# Patient Record
Sex: FEMALE | Race: AMERICAN INDIAN OR ALASKA NATIVE | Employment: OTHER | URBAN - METROPOLITAN AREA
[De-identification: names, ages, dates, MRNs, and addresses within clinical notes are randomized per-mention and may not be internally consistent; named-entity substitution may affect disease eponyms.]

---

## 2017-11-01 PROBLEM — I42.8 NICM (NONISCHEMIC CARDIOMYOPATHY) (HCC): Status: ACTIVE | Noted: 2017-11-01

## 2017-11-01 PROBLEM — I51.81 TAKOTSUBO CARDIOMYOPATHY: Status: ACTIVE | Noted: 2017-11-01

## 2017-11-01 PROBLEM — I10 ESSENTIAL HYPERTENSION: Status: ACTIVE | Noted: 2017-11-01

## 2019-06-27 ENCOUNTER — HOSPITAL ENCOUNTER (OUTPATIENT)
Dept: LAB | Age: 64
Discharge: HOME | End: 2019-06-27

## 2019-06-27 PROCEDURE — 88305 TISSUE EXAM BY PATHOLOGIST: CPT

## 2019-06-27 PROCEDURE — 88312 SPECIAL STAINS GROUP 1: CPT

## 2021-05-05 PROBLEM — R07.9 CHEST PAIN: Status: ACTIVE | Noted: 2021-05-05

## 2021-05-05 PROBLEM — R94.31 ABNORMAL EKG: Status: ACTIVE | Noted: 2021-05-05

## 2021-06-30 PROBLEM — R93.1 ABNORMAL NUCLEAR CARDIAC IMAGING TEST: Status: ACTIVE | Noted: 2021-06-30

## 2021-07-06 ENCOUNTER — HOSPITAL ENCOUNTER (OUTPATIENT)
Dept: LAB | Age: 66
Discharge: HOME | End: 2021-07-06
Payer: MEDICARE

## 2021-07-06 DIAGNOSIS — I51.81 TAKOTSUBO CARDIOMYOPATHY: ICD-10-CM

## 2021-07-06 DIAGNOSIS — R07.9 CHEST PAIN, UNSPECIFIED TYPE: ICD-10-CM

## 2021-07-06 DIAGNOSIS — I42.8 NICM (NONISCHEMIC CARDIOMYOPATHY) (HCC): ICD-10-CM

## 2021-07-06 DIAGNOSIS — R93.1 ABNORMAL NUCLEAR CARDIAC IMAGING TEST: ICD-10-CM

## 2021-07-06 LAB
ANION GAP SERPL CALC-SCNC: 4 MMOL/L (ref 7–16)
BASOPHILS # BLD: 0 K/UL (ref 0–0.2)
BASOPHILS NFR BLD: 0 % (ref 0–2)
BUN SERPL-MCNC: 18 MG/DL (ref 8–23)
CALCIUM SERPL-MCNC: 9.3 MG/DL (ref 8.3–10.4)
CHLORIDE SERPL-SCNC: 98 MMOL/L (ref 98–107)
CO2 SERPL-SCNC: 35 MMOL/L (ref 21–32)
CREAT SERPL-MCNC: 1.21 MG/DL (ref 0.6–1)
DIFFERENTIAL METHOD BLD: NORMAL
EOSINOPHIL # BLD: 0.2 K/UL (ref 0–0.8)
EOSINOPHIL NFR BLD: 3 % (ref 0.5–7.8)
ERYTHROCYTE [DISTWIDTH] IN BLOOD BY AUTOMATED COUNT: 12.8 % (ref 11.9–14.6)
GLUCOSE SERPL-MCNC: 273 MG/DL (ref 65–100)
HCT VFR BLD AUTO: 40.4 % (ref 35.8–46.3)
HGB BLD-MCNC: 12.9 G/DL (ref 11.7–15.4)
IMM GRANULOCYTES # BLD AUTO: 0 K/UL (ref 0–0.5)
IMM GRANULOCYTES NFR BLD AUTO: 0 % (ref 0–5)
LYMPHOCYTES # BLD: 2.9 K/UL (ref 0.5–4.6)
LYMPHOCYTES NFR BLD: 42 % (ref 13–44)
MCH RBC QN AUTO: 28.5 PG (ref 26.1–32.9)
MCHC RBC AUTO-ENTMCNC: 31.9 G/DL (ref 31.4–35)
MCV RBC AUTO: 89.4 FL (ref 79.6–97.8)
MONOCYTES # BLD: 0.6 K/UL (ref 0.1–1.3)
MONOCYTES NFR BLD: 9 % (ref 4–12)
NEUTS SEG # BLD: 3.1 K/UL (ref 1.7–8.2)
NEUTS SEG NFR BLD: 45 % (ref 43–78)
NRBC # BLD: 0 K/UL (ref 0–0.2)
PLATELET # BLD AUTO: 181 K/UL (ref 150–450)
PMV BLD AUTO: 10.5 FL (ref 9.4–12.3)
POTASSIUM SERPL-SCNC: 4.1 MMOL/L (ref 3.5–5.1)
RBC # BLD AUTO: 4.52 M/UL (ref 4.05–5.2)
SODIUM SERPL-SCNC: 137 MMOL/L (ref 136–145)
WBC # BLD AUTO: 6.9 K/UL (ref 4.3–11.1)

## 2021-07-06 PROCEDURE — 36415 COLL VENOUS BLD VENIPUNCTURE: CPT

## 2021-07-06 PROCEDURE — 80048 BASIC METABOLIC PNL TOTAL CA: CPT

## 2021-07-06 PROCEDURE — 85025 COMPLETE CBC W/AUTO DIFF WBC: CPT

## 2021-07-07 NOTE — PROGRESS NOTES
Patient pre-assessment complete for [facility] scheduled for [facility], arrival time 0700. Patient verified using . Patient instructed to bring all home medications in labeled bottles on the day of procedure. NPO status reinforced. Patient informed to take a full dose aspirin 325mg  or 81 mg x 4 on the day of procedure. Patient instructed to HOLD all diabetic medications and over the counter medications. Instructed they can take all other medications excluding vitamins & supplements. Patient verbalizes understanding of all instructions & denies any questions at this time.

## 2021-07-08 ENCOUNTER — HOSPITAL ENCOUNTER (OUTPATIENT)
Age: 66
Setting detail: OUTPATIENT SURGERY
Discharge: HOME | End: 2021-07-08
Attending: INTERNAL MEDICINE | Admitting: INTERNAL MEDICINE
Payer: MEDICARE

## 2021-07-08 VITALS
BODY MASS INDEX: 36.65 KG/M2 | HEIGHT: 65 IN | OXYGEN SATURATION: 96 % | WEIGHT: 220 LBS | DIASTOLIC BLOOD PRESSURE: 98 MMHG | HEART RATE: 73 BPM | SYSTOLIC BLOOD PRESSURE: 177 MMHG

## 2021-07-08 DIAGNOSIS — R94.31 ABNORMAL EKG: ICD-10-CM

## 2021-07-08 DIAGNOSIS — I42.9 CARDIOMYOPATHY (HCC): ICD-10-CM

## 2021-07-08 DIAGNOSIS — I42.8 NICM (NONISCHEMIC CARDIOMYOPATHY) (HCC): ICD-10-CM

## 2021-07-08 DIAGNOSIS — R93.1 ABNORMAL NUCLEAR CARDIAC IMAGING TEST: ICD-10-CM

## 2021-07-08 DIAGNOSIS — I42.8 OTHER CARDIOMYOPATHY (HCC): ICD-10-CM

## 2021-07-08 DIAGNOSIS — R07.9 CHEST PAIN, UNSPECIFIED TYPE: ICD-10-CM

## 2021-07-08 LAB
ATRIAL RATE: 67 BPM
CALCULATED P AXIS, ECG09: 43 DEGREES
CALCULATED R AXIS, ECG10: -54 DEGREES
CALCULATED T AXIS, ECG11: -65 DEGREES
DIAGNOSIS, 93000: NORMAL
P-R INTERVAL, ECG05: 158 MS
Q-T INTERVAL, ECG07: 424 MS
QRS DURATION, ECG06: 132 MS
QTC CALCULATION (BEZET), ECG08: 448 MS
VENTRICULAR RATE, ECG03: 67 BPM

## 2021-07-08 PROCEDURE — 76210000024 HC REC RM PH II 2.5 TO 3 HR: Performed by: INTERNAL MEDICINE

## 2021-07-08 PROCEDURE — 93458 L HRT ARTERY/VENTRICLE ANGIO: CPT | Performed by: INTERNAL MEDICINE

## 2021-07-08 PROCEDURE — 93005 ELECTROCARDIOGRAM TRACING: CPT | Performed by: INTERNAL MEDICINE

## 2021-07-08 PROCEDURE — 74011250637 HC RX REV CODE- 250/637: Performed by: INTERNAL MEDICINE

## 2021-07-08 PROCEDURE — 74011000250 HC RX REV CODE- 250: Performed by: INTERNAL MEDICINE

## 2021-07-08 PROCEDURE — 77030029997 HC DEV COM RDL R BND TELE -B: Performed by: INTERNAL MEDICINE

## 2021-07-08 PROCEDURE — 77030016699 HC CATH ANGI DX INFN1 CARD -A: Performed by: INTERNAL MEDICINE

## 2021-07-08 PROCEDURE — 99152 MOD SED SAME PHYS/QHP 5/>YRS: CPT | Performed by: INTERNAL MEDICINE

## 2021-07-08 PROCEDURE — C1769 GUIDE WIRE: HCPCS | Performed by: INTERNAL MEDICINE

## 2021-07-08 PROCEDURE — 74011250636 HC RX REV CODE- 250/636: Performed by: INTERNAL MEDICINE

## 2021-07-08 PROCEDURE — C1894 INTRO/SHEATH, NON-LASER: HCPCS | Performed by: INTERNAL MEDICINE

## 2021-07-08 PROCEDURE — 77030015766: Performed by: INTERNAL MEDICINE

## 2021-07-08 PROCEDURE — 74011000636 HC RX REV CODE- 636: Performed by: INTERNAL MEDICINE

## 2021-07-08 RX ORDER — SODIUM CHLORIDE 0.9 % (FLUSH) 0.9 %
5-40 SYRINGE (ML) INJECTION AS NEEDED
Status: DISCONTINUED | OUTPATIENT
Start: 2021-07-08 | End: 2021-07-08 | Stop reason: HOSPADM

## 2021-07-08 RX ORDER — FENTANYL CITRATE 50 UG/ML
INJECTION, SOLUTION INTRAMUSCULAR; INTRAVENOUS AS NEEDED
Status: DISCONTINUED | OUTPATIENT
Start: 2021-07-08 | End: 2021-07-08 | Stop reason: HOSPADM

## 2021-07-08 RX ORDER — DIPHENHYDRAMINE HYDROCHLORIDE 50 MG/ML
50 INJECTION, SOLUTION INTRAMUSCULAR; INTRAVENOUS ONCE
Status: COMPLETED | OUTPATIENT
Start: 2021-07-08 | End: 2021-07-08

## 2021-07-08 RX ORDER — HYDROCORTISONE SODIUM SUCCINATE 100 MG/2ML
100 INJECTION, POWDER, FOR SOLUTION INTRAMUSCULAR; INTRAVENOUS ONCE
Status: COMPLETED | OUTPATIENT
Start: 2021-07-08 | End: 2021-07-08

## 2021-07-08 RX ORDER — LIDOCAINE HYDROCHLORIDE 10 MG/ML
INJECTION INFILTRATION; PERINEURAL AS NEEDED
Status: DISCONTINUED | OUTPATIENT
Start: 2021-07-08 | End: 2021-07-08 | Stop reason: HOSPADM

## 2021-07-08 RX ORDER — HEPARIN SODIUM 200 [USP'U]/100ML
INJECTION, SOLUTION INTRAVENOUS
Status: COMPLETED | OUTPATIENT
Start: 2021-07-08 | End: 2021-07-08

## 2021-07-08 RX ORDER — SODIUM CHLORIDE 9 MG/ML
1 INJECTION, SOLUTION INTRAVENOUS AS NEEDED
Status: DISCONTINUED | OUTPATIENT
Start: 2021-07-08 | End: 2021-07-08 | Stop reason: HOSPADM

## 2021-07-08 RX ORDER — SODIUM CHLORIDE 9 MG/ML
75 INJECTION, SOLUTION INTRAVENOUS CONTINUOUS
Status: DISCONTINUED | OUTPATIENT
Start: 2021-07-08 | End: 2021-07-08 | Stop reason: HOSPADM

## 2021-07-08 RX ORDER — GUAIFENESIN 100 MG/5ML
324 LIQUID (ML) ORAL ONCE
Status: COMPLETED | OUTPATIENT
Start: 2021-07-08 | End: 2021-07-08

## 2021-07-08 RX ORDER — MIDAZOLAM HYDROCHLORIDE 1 MG/ML
INJECTION, SOLUTION INTRAMUSCULAR; INTRAVENOUS AS NEEDED
Status: DISCONTINUED | OUTPATIENT
Start: 2021-07-08 | End: 2021-07-08 | Stop reason: HOSPADM

## 2021-07-08 RX ORDER — SODIUM CHLORIDE 9 MG/ML
75 INJECTION, SOLUTION INTRAVENOUS CONTINUOUS
Status: DISCONTINUED | OUTPATIENT
Start: 2021-07-08 | End: 2021-07-08

## 2021-07-08 RX ORDER — SODIUM CHLORIDE 0.9 % (FLUSH) 0.9 %
5-40 SYRINGE (ML) INJECTION EVERY 8 HOURS
Status: DISCONTINUED | OUTPATIENT
Start: 2021-07-08 | End: 2021-07-08 | Stop reason: HOSPADM

## 2021-07-08 RX ADMIN — HYDROCORTISONE SODIUM SUCCINATE 100 MG: 100 INJECTION, POWDER, FOR SOLUTION INTRAMUSCULAR; INTRAVENOUS at 08:31

## 2021-07-08 RX ADMIN — DIPHENHYDRAMINE HYDROCHLORIDE 50 MG: 50 INJECTION, SOLUTION INTRAMUSCULAR; INTRAVENOUS at 08:38

## 2021-07-08 RX ADMIN — FAMOTIDINE 20 MG: 10 INJECTION INTRAVENOUS at 08:34

## 2021-07-08 RX ADMIN — ASPIRIN 81 MG 325 MG: 81 TABLET ORAL at 07:00

## 2021-07-08 RX ADMIN — SODIUM CHLORIDE 1 ML/KG/HR: 900 INJECTION, SOLUTION INTRAVENOUS at 08:30

## 2021-07-08 NOTE — DISCHARGE INSTRUCTIONS

## 2021-07-08 NOTE — Clinical Note
TRANSFER - OUT REPORT:     Verbal report given to: cpru rn. Report consisted of patient's Situation, Background, Assessment and   Recommendations(SBAR). Opportunity for questions and clarification was provided. Patient transported with a Registered Nurse.

## 2021-07-08 NOTE — ROUTINE PROCESS
Patient received to [address] [place] [address] room # 10  Ambulatory from [facility]. Patient scheduled for [facility] today with Dr Baljinder Goldman. Procedure reviewed & questions answered, voiced good understanding consent obtained & placed on chart. All medications and medical history reviewed. Will prep patient per orders. Patient & family updated on plan of care. The patient has a fraility score of 4-VULNERABLE, based on transferred via wheelchair.

## 2021-07-08 NOTE — PROGRESS NOTES
TRANSFER - OUT REPORT:    Verbal report given to RN(name) on Yajaira Alvarez  being transferred to CPRU(unit) for routine progression of care       Report consisted of patients Situation, Background, Assessment and   Recommendations(SBAR). Information from the following report(s) SBAR was reviewed with the receiving nurse.     [facility] w/ Dr. Ian SEPULVEDA radial  TR band 12 mls  2 mg versed  50 mcg fentanyl

## 2021-07-08 NOTE — Clinical Note
TRANSFER - IN REPORT:     Verbal report received from: cpru rn. Report consisted of patient's Situation, Background, Assessment and   Recommendations(SBAR). Opportunity for questions and clarification was provided. Assessment completed upon patient's arrival to unit and care assumed. Patient transported with a Cardiac Cath Tech / Patient Care Tech.

## 2021-07-08 NOTE — Clinical Note
Contrast Dose Calculator:   Patient's age: 76.   Patient's sex: Female. Patient weight (kg) = 99.8. Creatinine level (mg/dL) = 1.21. Creatinine clearance (mL/min): 72.   Contrast concentration (mg/mL) = 370. MACD = 300 mL. Max Contrast dose per Creatinine Cl calculator = 162 mL.

## 2022-02-19 ENCOUNTER — HOME HEALTH ADMISSION (OUTPATIENT)
Dept: HOME HEALTH SERVICES | Facility: HOME HEALTH | Age: 67
End: 2022-02-19

## 2022-03-18 PROBLEM — R93.1 ABNORMAL NUCLEAR CARDIAC IMAGING TEST: Status: ACTIVE | Noted: 2021-06-30

## 2022-03-18 PROBLEM — R07.9 CHEST PAIN: Status: ACTIVE | Noted: 2021-05-05

## 2022-03-19 PROBLEM — I51.81 TAKOTSUBO CARDIOMYOPATHY: Status: ACTIVE | Noted: 2017-11-01

## 2022-03-19 PROBLEM — I10 ESSENTIAL HYPERTENSION: Status: ACTIVE | Noted: 2017-11-01

## 2022-03-19 PROBLEM — R94.31 ABNORMAL EKG: Status: ACTIVE | Noted: 2021-05-05

## 2022-03-20 PROBLEM — I42.8 NICM (NONISCHEMIC CARDIOMYOPATHY) (HCC): Status: ACTIVE | Noted: 2017-11-01

## 2022-05-09 PROBLEM — N18.30 CHRONIC RENAL DISEASE, STAGE III (HCC): Status: ACTIVE | Noted: 2022-05-09

## 2022-06-02 NOTE — TELEPHONE ENCOUNTER
Patient requesting refill,  She takes with pain meds x4 a day.    states her bladder cancer is acting up and pain meds hurt her stomach

## 2022-06-03 RX ORDER — PROMETHAZINE HYDROCHLORIDE 25 MG/1
25 TABLET ORAL EVERY 8 HOURS PRN
Qty: 20 TABLET | Refills: 1 | Status: SHIPPED | OUTPATIENT
Start: 2022-06-03 | End: 2022-06-08

## 2022-06-08 RX ORDER — PROMETHAZINE HYDROCHLORIDE 25 MG/1
25 TABLET ORAL EVERY 8 HOURS PRN
Qty: 90 TABLET | Refills: 3 | Status: SHIPPED | OUTPATIENT
Start: 2022-06-08 | End: 2022-11-01 | Stop reason: SDUPTHER

## 2022-06-14 ENCOUNTER — OFFICE VISIT (OUTPATIENT)
Dept: FAMILY MEDICINE CLINIC | Facility: CLINIC | Age: 67
End: 2022-06-14
Payer: MEDICARE

## 2022-06-14 ENCOUNTER — NURSE ONLY (OUTPATIENT)
Dept: FAMILY MEDICINE CLINIC | Facility: CLINIC | Age: 67
End: 2022-06-14
Payer: MEDICARE

## 2022-06-14 VITALS
WEIGHT: 242 LBS | SYSTOLIC BLOOD PRESSURE: 151 MMHG | HEIGHT: 66 IN | BODY MASS INDEX: 38.89 KG/M2 | TEMPERATURE: 97.8 F | DIASTOLIC BLOOD PRESSURE: 78 MMHG | OXYGEN SATURATION: 98 % | HEART RATE: 78 BPM | RESPIRATION RATE: 16 BRPM

## 2022-06-14 DIAGNOSIS — I42.8 NICM (NONISCHEMIC CARDIOMYOPATHY) (HCC): ICD-10-CM

## 2022-06-14 DIAGNOSIS — F41.9 ANXIETY: ICD-10-CM

## 2022-06-14 DIAGNOSIS — M51.36 DDD (DEGENERATIVE DISC DISEASE), LUMBAR: ICD-10-CM

## 2022-06-14 DIAGNOSIS — D64.9 ANEMIA, UNSPECIFIED TYPE: ICD-10-CM

## 2022-06-14 DIAGNOSIS — R06.02 SOB (SHORTNESS OF BREATH): ICD-10-CM

## 2022-06-14 DIAGNOSIS — I10 ESSENTIAL HYPERTENSION: Primary | ICD-10-CM

## 2022-06-14 DIAGNOSIS — C67.9 MALIGNANT NEOPLASM OF URINARY BLADDER, UNSPECIFIED SITE (HCC): ICD-10-CM

## 2022-06-14 DIAGNOSIS — E08.42 DIABETIC POLYNEUROPATHY ASSOCIATED WITH DIABETES MELLITUS DUE TO UNDERLYING CONDITION (HCC): ICD-10-CM

## 2022-06-14 PROCEDURE — 3017F COLORECTAL CA SCREEN DOC REV: CPT | Performed by: FAMILY MEDICINE

## 2022-06-14 PROCEDURE — 1090F PRES/ABSN URINE INCON ASSESS: CPT | Performed by: FAMILY MEDICINE

## 2022-06-14 PROCEDURE — 1123F ACP DISCUSS/DSCN MKR DOCD: CPT | Performed by: FAMILY MEDICINE

## 2022-06-14 PROCEDURE — 99213 OFFICE O/P EST LOW 20 MIN: CPT | Performed by: FAMILY MEDICINE

## 2022-06-14 PROCEDURE — 1036F TOBACCO NON-USER: CPT | Performed by: FAMILY MEDICINE

## 2022-06-14 PROCEDURE — G8419 CALC BMI OUT NRM PARAM NOF/U: HCPCS | Performed by: FAMILY MEDICINE

## 2022-06-14 PROCEDURE — G8400 PT W/DXA NO RESULTS DOC: HCPCS | Performed by: FAMILY MEDICINE

## 2022-06-14 PROCEDURE — G8427 DOCREV CUR MEDS BY ELIG CLIN: HCPCS | Performed by: FAMILY MEDICINE

## 2022-06-14 PROCEDURE — 2022F DILAT RTA XM EVC RTNOPTHY: CPT | Performed by: FAMILY MEDICINE

## 2022-06-14 RX ORDER — OMEPRAZOLE 40 MG/1
40 CAPSULE, DELAYED RELEASE ORAL DAILY
Qty: 30 CAPSULE | Refills: 5 | Status: SHIPPED | OUTPATIENT
Start: 2022-06-14

## 2022-06-14 RX ORDER — NITROFURANTOIN 25; 75 MG/1; MG/1
100 CAPSULE ORAL 2 TIMES DAILY
COMMUNITY
Start: 2022-06-09 | End: 2022-06-30

## 2022-06-14 RX ORDER — GABAPENTIN 300 MG/1
CAPSULE ORAL
Qty: 90 CAPSULE | Refills: 5 | Status: SHIPPED | OUTPATIENT
Start: 2022-06-14 | End: 2022-12-14

## 2022-06-14 RX ORDER — INSULIN GLARGINE 100 [IU]/ML
INJECTION, SOLUTION SUBCUTANEOUS
COMMUNITY
Start: 2022-05-10 | End: 2022-10-31 | Stop reason: SDUPTHER

## 2022-06-14 RX ORDER — VENLAFAXINE HYDROCHLORIDE 37.5 MG/1
37.5 CAPSULE, EXTENDED RELEASE ORAL DAILY
Qty: 30 CAPSULE | Refills: 11 | Status: SHIPPED | OUTPATIENT
Start: 2022-06-14 | End: 2023-06-14

## 2022-06-14 RX ORDER — LORAZEPAM 1 MG/1
1 TABLET ORAL EVERY 6 HOURS PRN
Qty: 30 TABLET | Refills: 3 | Status: SHIPPED | OUTPATIENT
Start: 2022-06-14 | End: 2022-07-14

## 2022-06-14 ASSESSMENT — PATIENT HEALTH QUESTIONNAIRE - PHQ9
SUM OF ALL RESPONSES TO PHQ QUESTIONS 1-9: 2
1. LITTLE INTEREST OR PLEASURE IN DOING THINGS: 1
2. FEELING DOWN, DEPRESSED OR HOPELESS: 1
SUM OF ALL RESPONSES TO PHQ QUESTIONS 1-9: 2
SUM OF ALL RESPONSES TO PHQ9 QUESTIONS 1 & 2: 2

## 2022-06-14 ASSESSMENT — ENCOUNTER SYMPTOMS: SHORTNESS OF BREATH: 1

## 2022-06-15 LAB
BASOPHILS # BLD: 0 K/UL (ref 0–0.2)
BASOPHILS NFR BLD: 1 % (ref 0–2)
DIFFERENTIAL METHOD BLD: ABNORMAL
EOSINOPHIL # BLD: 0.2 K/UL (ref 0–0.8)
EOSINOPHIL NFR BLD: 4 % (ref 0.5–7.8)
ERYTHROCYTE [DISTWIDTH] IN BLOOD BY AUTOMATED COUNT: 14.5 % (ref 11.9–14.6)
FERRITIN SERPL-MCNC: 158 NG/ML (ref 8–388)
HCT VFR BLD AUTO: 35.5 % (ref 35.8–46.3)
HGB BLD-MCNC: 10.7 G/DL (ref 11.7–15.4)
IMM GRANULOCYTES # BLD AUTO: 0 K/UL (ref 0–0.5)
IMM GRANULOCYTES NFR BLD AUTO: 1 % (ref 0–5)
LYMPHOCYTES # BLD: 2 K/UL (ref 0.5–4.6)
LYMPHOCYTES NFR BLD: 37 % (ref 13–44)
MCH RBC QN AUTO: 28 PG (ref 26.1–32.9)
MCHC RBC AUTO-ENTMCNC: 30.1 G/DL (ref 31.4–35)
MCV RBC AUTO: 92.9 FL (ref 79.6–97.8)
MONOCYTES # BLD: 0.5 K/UL (ref 0.1–1.3)
MONOCYTES NFR BLD: 9 % (ref 4–12)
NEUTS SEG # BLD: 2.5 K/UL (ref 1.7–8.2)
NEUTS SEG NFR BLD: 48 % (ref 43–78)
NRBC # BLD: 0 K/UL (ref 0–0.2)
NT PRO BNP: 3346 PG/ML (ref 5–125)
PLATELET # BLD AUTO: 129 K/UL (ref 150–450)
PMV BLD AUTO: 10 FL (ref 9.4–12.3)
RBC # BLD AUTO: 3.82 M/UL (ref 4.05–5.2)
WBC # BLD AUTO: 5.2 K/UL (ref 4.3–11.1)

## 2022-06-15 NOTE — PROGRESS NOTES
Subjective:      Patient ID: Lien Kellogg is a 78 y.o. female. HPI   Patient comes in today for follow-up on hospitalization last month for sepsis and possible UTI with altered mental status. She also has been discovered to have bladder cancer and is being scheduled in July for surgery. This caused some increased anxiety. She still was passing some blood through her urine and her hemoglobin was noted to be lower last time. She does experience some shortness of breath but no chest pain. She is followed by cardiology. Past Medical History:   Diagnosis Date    Arthritis     osteo    Bladder cancer ([facility] 2010    Cancer ([facility] x 4 years ago     right breast lumpectomy- chemo and radiation    Chronic pain     De Quervain thyroiditis 7/29/2013    Depression 7/29/2013    Diabetes ([facility] no problems with low sugars    type 2 - oral agents. average BS- 150's         Fibromyalgia     Fibromyalgia 7/29/2013    GERD (gastroesophageal reflux disease)     Headache(784.0)     bad headaches    Hypertension     Insomnia 7/29/2013    Knee pain 7/29/2013    Osteoarthritis 7/29/2013    Panic disorder     has not had problems with for at least a year    Stroke [facility])     Vitamin D deficiency 7/29/2013       Allergies   Allergen Reactions    Amoxicillin Other (See Comments)     whelps    Cephalexin Other (See Comments)     Whelps    Duloxetine Other (See Comments)     seizures    Paroxetine Hcl Other (See Comments)     Did not tolerate well    Sulfa Antibiotics Other (See Comments)     whelps    Sumatriptan Other (See Comments)    Adhesive Tape Rash     Tape and Band-Aid caused redness. May use paper tape.     Topiramate Nausea And Vomiting       Current Outpatient Medications   Medication Sig Dispense Refill    nitrofurantoin, macrocrystal-monohydrate, (MACROBID) 100 MG capsule Take 100 mg by mouth 2 times daily      LANTUS SOLOSTAR 100 UNIT/ML injection pen       gabapentin (NEURONTIN) 300 MG capsule 1 po qam , and 2 po qhs. 90 capsule 5    LORazepam (ATIVAN) 1 MG tablet Take 1 tablet by mouth every 6 hours as needed for Anxiety for up to 30 days. 30 tablet 3    venlafaxine (EFFEXOR XR) 37.5 MG extended release capsule Take 1 capsule by mouth daily 30 capsule 11    omeprazole (PRILOSEC) 40 MG delayed release capsule Take 1 capsule by mouth daily 30 capsule 5    promethazine (PHENERGAN) 25 MG tablet TAKE 1 TABLET BY MOUTH EVERY 8 HOURS AS NEEDED FOR NAUSEA 90 tablet 3    aspirin 325 MG tablet Take 325 mg by mouth daily      butalbital-acetaminophen-caffeine (FIORICET, ESGIC) -40 MG per tablet Take 1 tablet by mouth every 6 hours as needed      chlorthalidone (HYGROTON) 25 MG tablet Take 25 mg by mouth daily      diclofenac (VOLTAREN) 50 MG EC tablet Take by mouth 2 times daily      divalproex (DEPAKOTE ER) 500 MG extended release tablet Take 500 mg by mouth 2 times daily      EPINEPHrine (EPIPEN) 0.3 MG/0.3ML SOAJ injection Inject 0.3 mg into the muscle once as needed      folic acid (FOLVITE) 1 MG tablet Take 1 mg by mouth daily      glimepiride (AMARYL) 4 MG tablet Take 4 mg by mouth      HYDROcodone-acetaminophen (NORCO)  MG per tablet Take 1 tablet by mouth 4 times daily.       lidocaine-prilocaine (EMLA) 2.5-2.5 % cream Apply topically as needed      loratadine (CLARITIN) 10 MG tablet Take 10 mg by mouth daily      magnesium oxide (MAG-OX) 400 MG tablet Take 400 mg by mouth 2 times daily      nitroGLYCERIN (NITROSTAT) 0.4 MG SL tablet Place 0.4 mg under the tongue      ramipril (ALTACE) 10 MG capsule Take 10 mg by mouth 2 times daily      rOPINIRole (REQUIP) 4 MG tablet Take 4 mg by mouth      rosuvastatin (CRESTOR) 10 MG tablet Take 10 mg by mouth      traZODone (DESYREL) 100 MG tablet Take 100 mg by mouth      carvedilol (COREG) 25 MG tablet Take 25 mg by mouth 2 times daily (with meals) (Patient not taking: Reported on 6/14/2022)       No current facility-administered medications for this visit. Social History     Tobacco Use    Smoking status: Never Smoker    Smokeless tobacco: Never Used   Substance Use Topics    Alcohol use: No     Alcohol/week: 0.0 standard drinks    Drug use: Yes     Types: Prescription     Review of Systems   Constitutional: Positive for fatigue. Respiratory: Positive for shortness of breath. Cardiovascular: Negative for chest pain. Musculoskeletal: Positive for arthralgias. Blood pressure (!) 151/78, pulse 78, temperature 97.8 °F (36.6 °C), temperature source Temporal, resp. rate 16, height 5' 5.5\" (1.664 m), weight 242 lb (109.8 kg), SpO2 98 %. Objective:   Physical Exam  Vitals reviewed. Constitutional:       General: She is not in acute distress. Appearance: Normal appearance. Cardiovascular:      Rate and Rhythm: Normal rate and regular rhythm. Pulses: Normal pulses. Pulmonary:      Effort: Pulmonary effort is normal.      Breath sounds: Normal breath sounds. Neurological:      General: No focal deficit present. Mental Status: She is alert and oriented to person, place, and time. Assessment / Plan:      Ann Hawthorne was seen today for diabetes and hypertension. Diagnoses and all orders for this visit:    Essential hypertension    NICM (nonischemic cardiomyopathy) ([facility]    Diabetic polyneuropathy associated with diabetes mellitus due to underlying condition (HCC)  -     gabapentin (NEURONTIN) 300 MG capsule; 1 po qam , and 2 po qhs.    DDD (degenerative disc disease), lumbar    Malignant neoplasm of urinary bladder, unspecified site (HCC)    Anxiety  -     LORazepam (ATIVAN) 1 MG tablet; Take 1 tablet by mouth every 6 hours as needed for Anxiety for up to 30 days. -     venlafaxine (EFFEXOR XR) 37.5 MG extended release capsule; Take 1 capsule by mouth daily    Anemia, unspecified type  -     CBC with Auto Differential; Future  -     Comprehensive Metabolic Panel;  Future  - Ferritin; Future    SOB (shortness of breath)  -     proBNP, N-TERMINAL; Future    Other orders  -     omeprazole (PRILOSEC) 40 MG delayed release capsule; Take 1 capsule by mouth daily     Will notify patient of test results. Follow-up and Dispositions    · Return in about 3 months (around 9/14/2022), or if symptoms worsen or fail to improve. Dictated using voice recognition software.  Proofread, but unrecognized errors may exist.

## 2022-07-05 ENCOUNTER — TELEPHONE (OUTPATIENT)
Dept: FAMILY MEDICINE CLINIC | Facility: CLINIC | Age: 67
End: 2022-07-05

## 2022-07-11 ENCOUNTER — TELEPHONE (OUTPATIENT)
Dept: FAMILY MEDICINE CLINIC | Facility: CLINIC | Age: 67
End: 2022-07-11

## 2022-07-11 NOTE — TELEPHONE ENCOUNTER
Authorization notification form received from [employer]. [facility]. Placed in Dr Lee Vera.

## 2022-07-11 NOTE — TELEPHONE ENCOUNTER
Home Health Certification and Plan of Care received from [facility]. Order # [id number]. Placed in Dr David Alarcon.

## 2022-07-12 ENCOUNTER — TELEPHONE (OUTPATIENT)
Dept: FAMILY MEDICINE CLINIC | Facility: CLINIC | Age: 67
End: 2022-07-12

## 2022-07-12 LAB
ALBUMIN SERPL-MCNC: 3.2 G/DL (ref 3.5–5)
ALBUMIN/GLOB SERPL: 0.7 {RATIO} (ref 1.1–2.2)
ALP SERPL-CCNC: 76 U/L (ref 50–136)
ALT SERPL-CCNC: 10 U/L (ref 30–65)
ANION GAP SERPL CALC-SCNC: 10 MMOL/L (ref 5–15)
AST SERPL-CCNC: 14 U/L (ref 15–37)
BILIRUB SERPL-MCNC: 0.7 MG/DL
BUN SERPL-MCNC: 27 MG/DL (ref 6–20)
CALCIUM SERPL-MCNC: 9.4 MG/DL (ref 8.5–10.1)
CHLORIDE SERPL-SCNC: 102 MMOL/L (ref 97–108)
CO2 SERPL-SCNC: 25 MMOL/L (ref 21–32)
CREAT SERPL-MCNC: 1.2 MG/DL (ref 0.55–1.02)
GLOBULIN SER CALC-MCNC: 4.8 G/DL (ref 2–4)
GLUCOSE SERPL-MCNC: 111 MG/DL (ref 50–100)
POTASSIUM SERPL-SCNC: 4.4 MMOL/L (ref 3.5–5.1)
PROT SERPL-MCNC: 8 G/DL (ref 6.4–8.2)
SODIUM SERPL-SCNC: 137 MMOL/L (ref 136–145)

## 2022-07-12 NOTE — TELEPHONE ENCOUNTER
Dwight CARDONA with [facility] called. Pt is being discharged from [address] [place] [address] today. Stating Pt did fall today. Without injury.

## 2022-09-30 RX ORDER — ROSUVASTATIN CALCIUM 10 MG/1
TABLET, COATED ORAL
Qty: 90 TABLET | Refills: 1 | Status: SHIPPED | OUTPATIENT
Start: 2022-09-30

## 2022-10-06 ENCOUNTER — TELEPHONE (OUTPATIENT)
Dept: FAMILY MEDICINE CLINIC | Facility: CLINIC | Age: 67
End: 2022-10-06

## 2022-10-06 DIAGNOSIS — M79.672 LEFT FOOT PAIN: Primary | ICD-10-CM

## 2022-10-06 NOTE — TELEPHONE ENCOUNTER
----- Message from Chapo Menjivar sent at 10/6/2022  2:39 PM EDT -----  Subject: Referral Request    Reason for referral request? Jannet Moy and can't walk on foot (left)  Provider patient wants to be referred to(if known):     Provider Phone Number(if known):[phone]    Additional Information for Provider?  [address] [place] [address] Viat[place]  ---------------------------------------------------------------------------  --------------  [address]    [phone]; OK to leave message on voicemail  ---------------------------------------------------------------------------  --------------

## 2022-10-06 NOTE — TELEPHONE ENCOUNTER
----- Message from Abdulaziz Jansen sent at 10/6/2022  2:39 PM EDT -----  Subject: Referral Request    Reason for referral request? Bird Lantigua and can't walk on foot (left)  Provider patient wants to be referred to(if known):     Provider Phone Number(if known):[phone]    Additional Information for Provider?  [facility]  ---------------------------------------------------------------------------  --------------  [address]    [phone]; OK to leave message on voicemail  ---------------------------------------------------------------------------  --------------

## 2022-10-26 ENCOUNTER — TELEPHONE (OUTPATIENT)
Dept: CARDIOLOGY CLINIC | Age: 67
End: 2022-10-26

## 2022-10-26 NOTE — TELEPHONE ENCOUNTER
Pt states she is being treated for bladder ca. Every time she has a treatment, she has difficulty breathing. C/o frequent falls. States has [place] coming to visit. Had echo and f/u appt scheduled in June that pt cancelled due to health issues. Explained to pt probably first step needs to be to have echo done asap and then schedule f/u visit after. Verb understanding.

## 2022-10-26 NOTE — TELEPHONE ENCOUNTER
Patient has recently been short of breath with minimal exertion. Also feels very fatigued and has been falling frequently.

## 2022-10-31 ENCOUNTER — OFFICE VISIT (OUTPATIENT)
Dept: FAMILY MEDICINE CLINIC | Facility: CLINIC | Age: 67
End: 2022-10-31
Payer: MEDICARE

## 2022-10-31 VITALS
HEART RATE: 92 BPM | TEMPERATURE: 98.6 F | RESPIRATION RATE: 18 BRPM | HEIGHT: 66 IN | SYSTOLIC BLOOD PRESSURE: 196 MMHG | DIASTOLIC BLOOD PRESSURE: 96 MMHG | WEIGHT: 258 LBS | BODY MASS INDEX: 41.46 KG/M2 | OXYGEN SATURATION: 96 %

## 2022-10-31 DIAGNOSIS — R06.02 SOB (SHORTNESS OF BREATH): ICD-10-CM

## 2022-10-31 DIAGNOSIS — I10 ESSENTIAL HYPERTENSION: ICD-10-CM

## 2022-10-31 DIAGNOSIS — Z79.4 TYPE 2 DIABETES MELLITUS WITH HYPERGLYCEMIA, WITH LONG-TERM CURRENT USE OF INSULIN (HCC): ICD-10-CM

## 2022-10-31 DIAGNOSIS — R30.0 DYSURIA: ICD-10-CM

## 2022-10-31 DIAGNOSIS — M54.50 ACUTE LEFT-SIDED LOW BACK PAIN WITHOUT SCIATICA: ICD-10-CM

## 2022-10-31 DIAGNOSIS — E11.65 TYPE 2 DIABETES MELLITUS WITH HYPERGLYCEMIA, WITH LONG-TERM CURRENT USE OF INSULIN (HCC): ICD-10-CM

## 2022-10-31 DIAGNOSIS — C67.9 MALIGNANT NEOPLASM OF URINARY BLADDER, UNSPECIFIED SITE (HCC): ICD-10-CM

## 2022-10-31 DIAGNOSIS — N39.0 URINARY TRACT INFECTION WITHOUT HEMATURIA, SITE UNSPECIFIED: Primary | ICD-10-CM

## 2022-10-31 LAB
ALBUMIN SERPL-MCNC: 2.7 G/DL (ref 3.2–4.6)
ALBUMIN/GLOB SERPL: 0.6 {RATIO} (ref 0.4–1.6)
ALP SERPL-CCNC: 95 U/L (ref 50–136)
ALT SERPL-CCNC: 12 U/L (ref 12–65)
ANION GAP SERPL CALC-SCNC: 5 MMOL/L (ref 2–11)
AST SERPL-CCNC: 16 U/L (ref 15–37)
BASOPHILS # BLD: 0 K/UL (ref 0–0.2)
BASOPHILS NFR BLD: 0 % (ref 0–2)
BILIRUB SERPL-MCNC: 0.4 MG/DL (ref 0.2–1.1)
BUN SERPL-MCNC: 30 MG/DL (ref 8–23)
CALCIUM SERPL-MCNC: 9 MG/DL (ref 8.3–10.4)
CHLORIDE SERPL-SCNC: 102 MMOL/L (ref 101–110)
CO2 SERPL-SCNC: 28 MMOL/L (ref 21–32)
CREAT SERPL-MCNC: 1.8 MG/DL (ref 0.6–1)
DIFFERENTIAL METHOD BLD: ABNORMAL
EOSINOPHIL # BLD: 0.1 K/UL (ref 0–0.8)
EOSINOPHIL NFR BLD: 2 % (ref 0.5–7.8)
ERYTHROCYTE [DISTWIDTH] IN BLOOD BY AUTOMATED COUNT: 15.9 % (ref 11.9–14.6)
GLOBULIN SER CALC-MCNC: 4.9 G/DL (ref 2.8–4.5)
GLUCOSE SERPL-MCNC: 263 MG/DL (ref 65–100)
HCT VFR BLD AUTO: 29.8 % (ref 35.8–46.3)
HGB BLD-MCNC: 9 G/DL (ref 11.7–15.4)
IMM GRANULOCYTES # BLD AUTO: 0 K/UL (ref 0–0.5)
IMM GRANULOCYTES NFR BLD AUTO: 0 % (ref 0–5)
LYMPHOCYTES # BLD: 1 K/UL (ref 0.5–4.6)
LYMPHOCYTES NFR BLD: 22 % (ref 13–44)
MCH RBC QN AUTO: 29.5 PG (ref 26.1–32.9)
MCHC RBC AUTO-ENTMCNC: 30.2 G/DL (ref 31.4–35)
MCV RBC AUTO: 97.7 FL (ref 82–102)
MONOCYTES # BLD: 0.5 K/UL (ref 0.1–1.3)
MONOCYTES NFR BLD: 11 % (ref 4–12)
NEUTS SEG # BLD: 2.9 K/UL (ref 1.7–8.2)
NEUTS SEG NFR BLD: 65 % (ref 43–78)
NRBC # BLD: 0 K/UL (ref 0–0.2)
NT PRO BNP: 4892 PG/ML (ref 5–125)
PLATELET # BLD AUTO: 186 K/UL (ref 150–450)
PMV BLD AUTO: 10.6 FL (ref 9.4–12.3)
POTASSIUM SERPL-SCNC: 5.5 MMOL/L (ref 3.5–5.1)
PROT SERPL-MCNC: 7.6 G/DL (ref 6.3–8.2)
RBC # BLD AUTO: 3.05 M/UL (ref 4.05–5.2)
SODIUM SERPL-SCNC: 135 MMOL/L (ref 133–143)
WBC # BLD AUTO: 4.5 K/UL (ref 4.3–11.1)

## 2022-10-31 PROCEDURE — 3078F DIAST BP <80 MM HG: CPT | Performed by: FAMILY MEDICINE

## 2022-10-31 PROCEDURE — G8427 DOCREV CUR MEDS BY ELIG CLIN: HCPCS | Performed by: FAMILY MEDICINE

## 2022-10-31 PROCEDURE — 1036F TOBACCO NON-USER: CPT | Performed by: FAMILY MEDICINE

## 2022-10-31 PROCEDURE — 94762 N-INVAS EAR/PLS OXIMTRY CONT: CPT | Performed by: FAMILY MEDICINE

## 2022-10-31 PROCEDURE — 1090F PRES/ABSN URINE INCON ASSESS: CPT | Performed by: FAMILY MEDICINE

## 2022-10-31 PROCEDURE — 3046F HEMOGLOBIN A1C LEVEL >9.0%: CPT | Performed by: FAMILY MEDICINE

## 2022-10-31 PROCEDURE — G8417 CALC BMI ABV UP PARAM F/U: HCPCS | Performed by: FAMILY MEDICINE

## 2022-10-31 PROCEDURE — 1123F ACP DISCUSS/DSCN MKR DOCD: CPT | Performed by: FAMILY MEDICINE

## 2022-10-31 PROCEDURE — G8400 PT W/DXA NO RESULTS DOC: HCPCS | Performed by: FAMILY MEDICINE

## 2022-10-31 PROCEDURE — 99214 OFFICE O/P EST MOD 30 MIN: CPT | Performed by: FAMILY MEDICINE

## 2022-10-31 PROCEDURE — 2022F DILAT RTA XM EVC RTNOPTHY: CPT | Performed by: FAMILY MEDICINE

## 2022-10-31 PROCEDURE — G8484 FLU IMMUNIZE NO ADMIN: HCPCS | Performed by: FAMILY MEDICINE

## 2022-10-31 PROCEDURE — 3017F COLORECTAL CA SCREEN DOC REV: CPT | Performed by: FAMILY MEDICINE

## 2022-10-31 PROCEDURE — 3074F SYST BP LT 130 MM HG: CPT | Performed by: FAMILY MEDICINE

## 2022-10-31 RX ORDER — INSULIN GLARGINE 100 [IU]/ML
25 INJECTION, SOLUTION SUBCUTANEOUS NIGHTLY
Qty: 15 ADJUSTABLE DOSE PRE-FILLED PEN SYRINGE | Refills: 5 | Status: SHIPPED | OUTPATIENT
Start: 2022-10-31

## 2022-10-31 RX ORDER — TIZANIDINE 4 MG/1
4 TABLET ORAL 3 TIMES DAILY PRN
Qty: 30 TABLET | Refills: 0 | Status: SHIPPED | OUTPATIENT
Start: 2022-10-31

## 2022-10-31 RX ORDER — PHENAZOPYRIDINE HYDROCHLORIDE 200 MG/1
200 TABLET, FILM COATED ORAL 3 TIMES DAILY PRN
Qty: 9 TABLET | Refills: 1 | Status: SHIPPED | OUTPATIENT
Start: 2022-10-31 | End: 2022-11-03

## 2022-10-31 RX ORDER — CHLORTHALIDONE 50 MG/1
50 TABLET ORAL DAILY
Qty: 30 TABLET | Refills: 6 | Status: SHIPPED | OUTPATIENT
Start: 2022-10-31

## 2022-10-31 RX ORDER — NITROFURANTOIN 25; 75 MG/1; MG/1
100 CAPSULE ORAL 2 TIMES DAILY
Qty: 20 CAPSULE | Refills: 0 | Status: SHIPPED | OUTPATIENT
Start: 2022-10-31 | End: 2022-11-10

## 2022-10-31 ASSESSMENT — PATIENT HEALTH QUESTIONNAIRE - PHQ9
SUM OF ALL RESPONSES TO PHQ9 QUESTIONS 1 & 2: 4
SUM OF ALL RESPONSES TO PHQ QUESTIONS 1-9: 15
8. MOVING OR SPEAKING SO SLOWLY THAT OTHER PEOPLE COULD HAVE NOTICED. OR THE OPPOSITE, BEING SO FIGETY OR RESTLESS THAT YOU HAVE BEEN MOVING AROUND A LOT MORE THAN USUAL: 1
1. LITTLE INTEREST OR PLEASURE IN DOING THINGS: 3
SUM OF ALL RESPONSES TO PHQ QUESTIONS 1-9: 14
SUM OF ALL RESPONSES TO PHQ QUESTIONS 1-9: 15
4. FEELING TIRED OR HAVING LITTLE ENERGY: 3
2. FEELING DOWN, DEPRESSED OR HOPELESS: 1
SUM OF ALL RESPONSES TO PHQ QUESTIONS 1-9: 15
10. IF YOU CHECKED OFF ANY PROBLEMS, HOW DIFFICULT HAVE THESE PROBLEMS MADE IT FOR YOU TO DO YOUR WORK, TAKE CARE OF THINGS AT HOME, OR GET ALONG WITH OTHER PEOPLE: 3
6. FEELING BAD ABOUT YOURSELF - OR THAT YOU ARE A FAILURE OR HAVE LET YOURSELF OR YOUR FAMILY DOWN: 0
5. POOR APPETITE OR OVEREATING: 2
7. TROUBLE CONCENTRATING ON THINGS, SUCH AS READING THE NEWSPAPER OR WATCHING TELEVISION: 3
9. THOUGHTS THAT YOU WOULD BE BETTER OFF DEAD, OR OF HURTING YOURSELF: 1
3. TROUBLE FALLING OR STAYING ASLEEP: 1

## 2022-10-31 ASSESSMENT — COLUMBIA-SUICIDE SEVERITY RATING SCALE - C-SSRS
2. HAVE YOU ACTUALLY HAD ANY THOUGHTS OF KILLING YOURSELF?: NO
6. HAVE YOU EVER DONE ANYTHING, STARTED TO DO ANYTHING, OR PREPARED TO DO ANYTHING TO END YOUR LIFE?: NO
1. WITHIN THE PAST MONTH, HAVE YOU WISHED YOU WERE DEAD OR WISHED YOU COULD GO TO SLEEP AND NOT WAKE UP?: YES

## 2022-10-31 ASSESSMENT — ENCOUNTER SYMPTOMS: SHORTNESS OF BREATH: 1

## 2022-10-31 NOTE — PROGRESS NOTES
Subjective:      Patient ID: Francie Callejas is a 78 y.o. female. HPI  Patient comes in today for follow-up on her diabetes, hypertension. She has been having some increased shortness of breath along with some swelling particularly in her left leg compared to right. 2 weeks ago they did a venous ultrasound which was negative for DVT. She has been undergoing chemotherapy and is followed by urology for history of bladder cancer. She has been very tired and weak and even fallen recently. She fell about a week ago bruising up her lower back and still has some significant pain there. She is also had some increased dysuria in the last few days and urinalysis last week at the urologist did suggest she had leukocyte positive and possible UTI but a culture apparently was not done. Fasting glucose readings have been elevated recently and she takes 20 units of Lantus. Past Medical History:   Diagnosis Date    Arthritis     osteo    Bladder cancer ([facility] [address] 2010    Cancer ([facility] [address] x 4 years ago     right breast lumpectomy- chemo and radiation    Chronic pain     De Quervain thyroiditis 7/29/2013    Depression 7/29/2013    Diabetes ([facility] [address] no problems with low sugars    type 2 - oral agents. average BS- 150's         Fibromyalgia     Fibromyalgia 7/29/2013    GERD (gastroesophageal reflux disease)     Headache(784.0)     bad headaches    Hypertension     Insomnia 7/29/2013    Knee pain 7/29/2013    Osteoarthritis 7/29/2013    Panic disorder     has not had problems with for at least a year    Stroke [facility])     Vitamin D deficiency 7/29/2013       Allergies   Allergen Reactions    Amoxicillin Other (See Comments)     whelps    Cephalexin Other (See Comments)     Whelps    Duloxetine Other (See Comments)     seizures    Paroxetine Hcl Other (See Comments)     Did not tolerate well    Sulfa Antibiotics Other (See Comments)     whelps    Sumatriptan Other (See Comments)    Adhesive Tape Rash     Tape and Band-Aid caused redness. May use paper tape. Topiramate Nausea And Vomiting       Current Outpatient Medications   Medication Sig Dispense Refill    chlorthalidone (HYGROTEN) 50 MG tablet Take 1 tablet by mouth daily 30 tablet 6    nitrofurantoin, macrocrystal-monohydrate, (MACROBID) 100 MG capsule Take 1 capsule by mouth 2 times daily for 10 days 20 capsule 0    LANTUS SOLOSTAR 100 UNIT/ML injection pen Inject 25 Units into the skin nightly 15 Adjustable Dose Pre-filled Pen Syringe 5    tiZANidine (ZANAFLEX) 4 MG tablet Take 1 tablet by mouth 3 times daily as needed (back pain) 30 tablet 0    phenazopyridine (PYRIDIUM) 200 MG tablet Take 1 tablet by mouth 3 times daily as needed for Pain 9 tablet 1    rosuvastatin (CRESTOR) 10 MG tablet TAKE 1 TABLET EVERY NIGHT 90 tablet 1    gabapentin (NEURONTIN) 300 MG capsule 1 po qam , and 2 po qhs. 90 capsule 5    venlafaxine (EFFEXOR XR) 37.5 MG extended release capsule Take 1 capsule by mouth daily 30 capsule 11    omeprazole (PRILOSEC) 40 MG delayed release capsule Take 1 capsule by mouth daily 30 capsule 5    promethazine (PHENERGAN) 25 MG tablet TAKE 1 TABLET BY MOUTH EVERY 8 HOURS AS NEEDED FOR NAUSEA 90 tablet 3    aspirin 325 MG tablet Take 325 mg by mouth daily      butalbital-acetaminophen-caffeine (FIORICET, ESGIC) -40 MG per tablet Take 1 tablet by mouth every 6 hours as needed      diclofenac (VOLTAREN) 50 MG EC tablet Take by mouth 2 times daily      divalproex (DEPAKOTE ER) 500 MG extended release tablet Take 500 mg by mouth 2 times daily      EPINEPHrine (EPIPEN) 0.3 MG/0.3ML SOAJ injection Inject 0.3 mg into the muscle once as needed      folic acid (FOLVITE) 1 MG tablet Take 1 mg by mouth daily      glimepiride (AMARYL) 4 MG tablet Take 4 mg by mouth      HYDROcodone-acetaminophen (NORCO)  MG per tablet Take 1 tablet by mouth 4 times daily.       lidocaine-prilocaine (EMLA) 2.5-2.5 % cream Apply topically as needed      loratadine (CLARITIN) 10 MG tablet Take 10 mg by mouth daily      magnesium oxide (MAG-OX) 400 MG tablet Take 400 mg by mouth 2 times daily      nitroGLYCERIN (NITROSTAT) 0.4 MG SL tablet Place 0.4 mg under the tongue      ramipril (ALTACE) 10 MG capsule Take 10 mg by mouth 2 times daily      rOPINIRole (REQUIP) 4 MG tablet Take 4 mg by mouth      traZODone (DESYREL) 100 MG tablet Take 100 mg by mouth      carvedilol (COREG) 25 MG tablet Take 25 mg by mouth 2 times daily (with meals) (Patient not taking: No sig reported)       No current facility-administered medications for this visit. Social History     Tobacco Use    Smoking status: Never    Smokeless tobacco: Never   Substance Use Topics    Alcohol use: No     Alcohol/week: 0.0 standard drinks    Drug use: Yes     Types: Prescription     Review of Systems   Constitutional:  Positive for fatigue. Respiratory:  Positive for shortness of breath. Cardiovascular:  Positive for leg swelling. Negative for chest pain. Genitourinary:  Positive for dysuria. Blood pressure (!) 196/96, pulse 92, temperature 98.6 °F (37 °C), temperature source Temporal, resp. rate 18, height 5' 5.5\" (1.664 m), weight 258 lb (117 kg), SpO2 96 %. Objective:   Physical Exam  Vitals reviewed. Constitutional:       General: She is not in acute distress. Appearance: Normal appearance. Cardiovascular:      Rate and Rhythm: Normal rate and regular rhythm. Pulses: Normal pulses. Pulmonary:      Effort: Pulmonary effort is normal.      Breath sounds: Normal breath sounds. Neurological:      General: No focal deficit present. Mental Status: She is alert and oriented to person, place, and time. Assessment / Plan:    Nicky Rinaldi was seen today for follow-up. Diagnoses and all orders for this visit:    Urinary tract infection without hematuria, site unspecified  -     nitrofurantoin, macrocrystal-monohydrate, (MACROBID) 100 MG capsule;  Take 1 capsule by mouth 2 times daily for 10 days    Essential hypertension  -     chlorthalidone (HYGROTEN) 50 MG tablet; Take 1 tablet by mouth daily  -     CBC with Auto Differential; Future  -     Comprehensive Metabolic Panel; Future  -     CBC with Auto Differential  -     Comprehensive Metabolic Panel    SOB (shortness of breath)  -     35570 - DC NONINVASV OXYGEN SATUT,CONTINUOUS  -     Brain Natriuretic Peptide; Future  -     Brain Natriuretic Peptide    Malignant neoplasm of urinary bladder, unspecified site [facility])    Type 2 diabetes mellitus with hyperglycemia, with long-term current use of insulin (HCC)  -     LANTUS SOLOSTAR 100 UNIT/ML injection pen; Inject 25 Units into the skin nightly  -     Hemoglobin A1C; Future  -     Hemoglobin A1C    Acute left-sided low back pain without sciatica  -     tiZANidine (ZANAFLEX) 4 MG tablet; Take 1 tablet by mouth 3 times daily as needed (back pain)    Dysuria  -     phenazopyridine (PYRIDIUM) 200 MG tablet; Take 1 tablet by mouth 3 times daily as needed for Pain       She is to see cardiology next month and have a repeat echo, increase the Lantus to 25 units daily. We will get an overnight oximetry as some of her shortness of breath seems to be worse at night and see if she qualifies for oxygen. Increase the chlorthalidone to 50 mg daily for her hypertension and recheck labs particularly potassium. We will give her Macrodantin for the apparent UTI. Follow-up with urology as planned. Will notify patient of test results. Follow-up and Dispositions    Return in about 3 weeks (around 11/21/2022), or if symptoms worsen or fail to improve. Dictated using voice recognition software.  Proofread, but unrecognized errors may exist.

## 2022-11-01 LAB
EST. AVERAGE GLUCOSE BLD GHB EST-MCNC: 177 MG/DL
HBA1C MFR BLD: 7.8 % (ref 4.8–5.6)

## 2022-11-01 RX ORDER — PROMETHAZINE HYDROCHLORIDE 25 MG/1
25 TABLET ORAL EVERY 8 HOURS PRN
Qty: 90 TABLET | Refills: 3 | Status: SHIPPED | OUTPATIENT
Start: 2022-11-01

## 2022-11-01 NOTE — RESULT ENCOUNTER NOTE
Let patient know that hemoglobin A1c is better at 7.8 blood sugar though was 263, creatinine has increased to 1.8 so make sure drinking plenty of fluids. Liver enzymes are good. BNP level is very high at 4892 which could explain some of the shortness of breath. I believe she has a follow-up with cardiology in the near future and I did increase the chlorthalidone to help not only with blood pressure but hopefully any fluid retention. Let me know if that is not helping with her shortness of breath. Hemoglobin is 9 which appears to be stable.   Let me know how the patient is doing

## 2022-11-03 ENCOUNTER — TELEPHONE (OUTPATIENT)
Dept: FAMILY MEDICINE CLINIC | Facility: CLINIC | Age: 67
End: 2022-11-03

## 2022-11-03 NOTE — TELEPHONE ENCOUNTER
[facility] received.  Placed on [employer] desk for filing

## 2022-11-12 ENCOUNTER — PATIENT MESSAGE (OUTPATIENT)
Dept: FAMILY MEDICINE CLINIC | Facility: CLINIC | Age: 67
End: 2022-11-12

## 2022-11-14 RX ORDER — CLOTRIMAZOLE AND BETAMETHASONE DIPROPIONATE 10; .64 MG/G; MG/G
CREAM TOPICAL
Qty: 45 G | Refills: 3 | Status: SHIPPED | OUTPATIENT
Start: 2022-11-14

## 2022-11-14 NOTE — TELEPHONE ENCOUNTER
From: Byron Kid  To: Dr. Bah Boys: 11/12/2022 8:26 AM EST  Subject: Medication request    Dr. Renay Olivo. I haven't had this medication in a while but it works great under my breast.  Please consider giving me a new script for this. It is clotrimazole and Betamethasome Dipropionate cream. 1%/.05% (base).  Thanks you

## 2022-11-14 NOTE — TELEPHONE ENCOUNTER
Sent in prescription for:     Requested Prescriptions     Signed Prescriptions Disp Refills    clotrimazole-betamethasone (LOTRISONE) 1-0.05 % cream 45 g 3     Sig: Apply topically 2 times daily.      Authorizing Provider: Jonathan Page

## 2022-11-21 ENCOUNTER — OFFICE VISIT (OUTPATIENT)
Dept: CARDIOLOGY CLINIC | Age: 67
End: 2022-11-21
Payer: MEDICARE

## 2022-11-21 VITALS
HEART RATE: 85 BPM | SYSTOLIC BLOOD PRESSURE: 132 MMHG | HEIGHT: 66 IN | BODY MASS INDEX: 42.28 KG/M2 | DIASTOLIC BLOOD PRESSURE: 77 MMHG

## 2022-11-21 DIAGNOSIS — I10 ESSENTIAL HYPERTENSION: ICD-10-CM

## 2022-11-21 DIAGNOSIS — I42.8 NICM (NONISCHEMIC CARDIOMYOPATHY) (HCC): Primary | ICD-10-CM

## 2022-11-21 PROCEDURE — 3017F COLORECTAL CA SCREEN DOC REV: CPT | Performed by: INTERNAL MEDICINE

## 2022-11-21 PROCEDURE — G8427 DOCREV CUR MEDS BY ELIG CLIN: HCPCS | Performed by: INTERNAL MEDICINE

## 2022-11-21 PROCEDURE — G8417 CALC BMI ABV UP PARAM F/U: HCPCS | Performed by: INTERNAL MEDICINE

## 2022-11-21 PROCEDURE — 3074F SYST BP LT 130 MM HG: CPT | Performed by: INTERNAL MEDICINE

## 2022-11-21 PROCEDURE — 3078F DIAST BP <80 MM HG: CPT | Performed by: INTERNAL MEDICINE

## 2022-11-21 PROCEDURE — G8484 FLU IMMUNIZE NO ADMIN: HCPCS | Performed by: INTERNAL MEDICINE

## 2022-11-21 PROCEDURE — 99214 OFFICE O/P EST MOD 30 MIN: CPT | Performed by: INTERNAL MEDICINE

## 2022-11-21 PROCEDURE — 1090F PRES/ABSN URINE INCON ASSESS: CPT | Performed by: INTERNAL MEDICINE

## 2022-11-21 PROCEDURE — 93000 ELECTROCARDIOGRAM COMPLETE: CPT | Performed by: INTERNAL MEDICINE

## 2022-11-21 PROCEDURE — G8400 PT W/DXA NO RESULTS DOC: HCPCS | Performed by: INTERNAL MEDICINE

## 2022-11-21 PROCEDURE — 1123F ACP DISCUSS/DSCN MKR DOCD: CPT | Performed by: INTERNAL MEDICINE

## 2022-11-21 PROCEDURE — 1036F TOBACCO NON-USER: CPT | Performed by: INTERNAL MEDICINE

## 2022-11-21 RX ORDER — OXYCODONE HYDROCHLORIDE 10 MG/1
TABLET ORAL
COMMUNITY
Start: 2022-11-09

## 2022-11-21 RX ORDER — UBIDECARENONE 75 MG
50 CAPSULE ORAL DAILY
COMMUNITY

## 2022-11-21 ASSESSMENT — ENCOUNTER SYMPTOMS
HEMATEMESIS: 0
HEMATOCHEZIA: 0
COLOR CHANGE: 0
ABDOMINAL PAIN: 0
SHORTNESS OF BREATH: 1
DIARRHEA: 0
BOWEL INCONTINENCE: 0
BLURRED VISION: 0
HOARSE VOICE: 0
ORTHOPNEA: 0
WHEEZING: 0
SPUTUM PRODUCTION: 0

## 2022-11-21 NOTE — PROGRESS NOTES
[facility] CARDIOLOGY  [address], [address] [address]  PHONE: [phone]        22        NAME:  Jeffery Chang  : 1955  MRN: [id number]       SUBJECTIVE:   Jeffery Chang is a 78 y.o. female seen for a follow up visit regarding the following: The patient has a hx of  a nonischemic cardiomyopathy and primary hypertension. Recent echo reported LV EF=35-40% with mild MR. She returns for follow up. I discussed echo results with the patient. Chief Complaint   Patient presents with    Cardiomyopathy     Echo follow up       HPI:    Hypertension  The problem is unchanged. The problem is uncontrolled. Associated symptoms include shortness of breath. Pertinent negatives include no anxiety, blurred vision, chest pain, headaches, malaise/fatigue, neck pain, orthopnea, palpitations, peripheral edema, PND or sweats. Past Medical History, Past Surgical History, Family history, Social History, and Medications were all reviewed with the patient today and updated as necessary. Current Outpatient Medications:     oxyCODONE HCl (OXY-IR) 10 MG immediate release tablet, , Disp: , Rfl:     vitamin B-12 (CYANOCOBALAMIN) 100 MCG tablet, Take 50 mcg by mouth daily, Disp: , Rfl:     sacubitril-valsartan (ENTRESTO) 49-51 MG per tablet, Take 1 tablet by mouth 2 times daily, Disp: 60 tablet, Rfl: 5    clotrimazole-betamethasone (LOTRISONE) 1-0.05 % cream, Apply topically 2 times daily. , Disp: 45 g, Rfl: 3    promethazine (PHENERGAN) 25 MG tablet, Take 1 tablet by mouth every 8 hours as needed for Nausea, Disp: 90 tablet, Rfl: 3    chlorthalidone (HYGROTEN) 50 MG tablet, Take 1 tablet by mouth daily, Disp: 30 tablet, Rfl: 6    LANTUS SOLOSTAR 100 UNIT/ML injection pen, Inject 25 Units into the skin nightly, Disp: 15 Adjustable Dose Pre-filled Pen Syringe, Rfl: 5    tiZANidine (ZANAFLEX) 4 MG tablet, Take 1 tablet by mouth 3 times daily as needed (back pain), Disp: 30 tablet, Rfl: 0 rosuvastatin (CRESTOR) 10 MG tablet, TAKE 1 TABLET EVERY NIGHT, Disp: 90 tablet, Rfl: 1    gabapentin (NEURONTIN) 300 MG capsule, 1 po qam , and 2 po qhs.  (Patient taking differently: 2 po qam , and 3 po qhs.), Disp: 90 capsule, Rfl: 5    venlafaxine (EFFEXOR XR) 37.5 MG extended release capsule, Take 1 capsule by mouth daily, Disp: 30 capsule, Rfl: 11    omeprazole (PRILOSEC) 40 MG delayed release capsule, Take 1 capsule by mouth daily, Disp: 30 capsule, Rfl: 5    aspirin 325 MG tablet, Take 325 mg by mouth daily, Disp: , Rfl:     butalbital-acetaminophen-caffeine (FIORICET, ESGIC) -40 MG per tablet, Take 1 tablet by mouth every 6 hours as needed, Disp: , Rfl:     carvedilol (COREG) 25 MG tablet, Take 25 mg by mouth 2 times daily (with meals), Disp: , Rfl:     diclofenac (VOLTAREN) 50 MG EC tablet, Take by mouth 2 times daily, Disp: , Rfl:     divalproex (DEPAKOTE ER) 500 MG extended release tablet, Take 500 mg by mouth 2 times daily, Disp: , Rfl:     EPINEPHrine (EPIPEN) 0.3 MG/0.3ML SOAJ injection, Inject 0.3 mg into the muscle once as needed, Disp: , Rfl:     folic acid (FOLVITE) 1 MG tablet, Take 1 mg by mouth daily, Disp: , Rfl:     glimepiride (AMARYL) 4 MG tablet, Take 4 mg by mouth, Disp: , Rfl:     lidocaine-prilocaine (EMLA) 2.5-2.5 % cream, Apply topically as needed, Disp: , Rfl:     loratadine (CLARITIN) 10 MG tablet, Take 10 mg by mouth daily, Disp: , Rfl:     nitroGLYCERIN (NITROSTAT) 0.4 MG SL tablet, Place 0.4 mg under the tongue, Disp: , Rfl:     rOPINIRole (REQUIP) 4 MG tablet, Take 4 mg by mouth, Disp: , Rfl:     traZODone (DESYREL) 100 MG tablet, Take 100 mg by mouth, Disp: , Rfl:     magnesium oxide (MAG-OX) 400 MG tablet, Take 400 mg by mouth 2 times daily (Patient not taking: Reported on 11/21/2022), Disp: , Rfl:   Allergies   Allergen Reactions    Amoxicillin Other (See Comments)     whelps    Cephalexin Other (See Comments)     Whelps    Duloxetine Other (See Comments)     seizures Paroxetine Hcl Other (See Comments)     Did not tolerate well    Sulfa Antibiotics Other (See Comments)     whelps    Sumatriptan Other (See Comments)    Adhesive Tape Rash     Tape and Band-Aid caused redness. May use paper tape. Topiramate Nausea And Vomiting     Past Medical History:   Diagnosis Date    Arthritis     osteo    Bladder cancer ([facility] [address] 2010    Cancer ([facility] [address] x 4 years ago     right breast lumpectomy- chemo and radiation    Chronic pain     De Quervain thyroiditis 2013    Depression 2013    Diabetes ([facility] [address] no problems with low sugars    type 2 - oral agents. average BS- 150's         Fibromyalgia     Fibromyalgia 2013    GERD (gastroesophageal reflux disease)     Headache(784.0)     bad headaches    Hypertension     Insomnia 2013    Knee pain 2013    Osteoarthritis 2013    Panic disorder     has not had problems with for at least a year    Stroke [facility])     Vitamin D deficiency 2013     Past Surgical History:   Procedure Laterality Date    APPENDECTOMY      BREAST LUMPECTOMY  2006    right, cancer    CARDIAC CATHETERIZATION  2021    LV:Severe apical HK. LM:nl. LAD&LCX& RCA have mild luminal irregularities.     CARDIAC CATHETERIZATION      4/10     SECTION      CHOLECYSTECTOMY, LAPAROSCOPIC      CYST REMOVAL      from left side of scalp    HYSTERECTOMY (CERVIX STATUS UNKNOWN)      KNEE ARTHROSCOPY      right    ORTHOPEDIC SURGERY      knee surgery     OTHER SURGICAL HISTORY  2015    removed tumors from bladder that was benign, Dr Vane Yen  2014    removed tumors from bladder, Dr Portillo Every HISTORY  13    tooth removed    OTHER SURGICAL HISTORY      eye surgery    OTHER SURGICAL HISTORY      kidney surgery     TUBAL LIGATION      UROLOGICAL SURGERY      as a child had surgery to reconnect ureter to kidney ,     UROLOGICAL SURGERY      bladder cancer    VASCULAR SURGERY      removed  port for chemo     Family History   Problem Relation Age of Onset    Cancer Paternal Grandfather     Other Other         colitis/ grandfather    Heart Disease Maternal Grandfather     Cancer Maternal Grandmother     Cancer Maternal Aunt     Heart Disease Brother     Stroke Father     Hypertension Father     Diabetes Father     Cancer Mother     Lupus Other         cousin      Social History     Tobacco Use    Smoking status: Never    Smokeless tobacco: Never   Substance Use Topics    Alcohol use: No     Alcohol/week: 0.0 standard drinks       ROS:    Review of Systems   Constitutional: Negative for chills, decreased appetite, diaphoresis, fever and malaise/fatigue. HENT:  Negative for congestion, hearing loss, hoarse voice and nosebleeds. Eyes:  Negative for blurred vision. Cardiovascular:  Positive for dyspnea on exertion. Negative for chest pain, claudication, cyanosis, irregular heartbeat, leg swelling, near-syncope, orthopnea, palpitations, paroxysmal nocturnal dyspnea and syncope. Respiratory:  Positive for shortness of breath. Negative for sputum production and wheezing. Endocrine: Negative for polydipsia, polyphagia and polyuria. Skin:  Negative for color change. Musculoskeletal:  Positive for falls. Negative for neck pain. Gastrointestinal:  Negative for abdominal pain, bowel incontinence, diarrhea, hematemesis and hematochezia. Genitourinary:  Negative for dysuria, frequency and hematuria. Neurological:  Negative for focal weakness, headaches, light-headedness, loss of balance, numbness, sensory change and weakness. Psychiatric/Behavioral:  Negative for altered mental status and memory loss. PHYSICAL EXAM:   /77   Pulse 85   Ht 5' 5.5\" (1.664 m)   BMI 42.28 kg/m²      Physical Exam  Constitutional:       Appearance: Normal appearance. She is obese. HENT:      Head: Normocephalic and atraumatic.       Nose: Nose normal.   Eyes:      Extraocular Movements: Extraocular movements intact. Pupils: Pupils are equal, round, and reactive to light. Neck:      Vascular: No carotid bruit. Cardiovascular:      Rate and Rhythm: Regular rhythm. Pulses: Normal pulses. Heart sounds: No murmur heard. Pulmonary:      Effort: Pulmonary effort is normal.      Breath sounds: Normal breath sounds. Abdominal:      General: Abdomen is flat. Bowel sounds are normal.      Palpations: Abdomen is soft. Musculoskeletal:         General: Normal range of motion. Cervical back: Normal range of motion and neck supple. Skin:     General: Skin is warm and dry. Neurological:      General: No focal deficit present. Mental Status: She is alert and oriented to person, place, and time. Psychiatric:         Mood and Affect: Mood normal.       Medical problems and test results were reviewed with the patient today.      Recent Results (from the past 672 hour(s))   Hemoglobin A1C    Collection Time: 10/31/22 12:38 PM   Result Value Ref Range    Hemoglobin A1C 7.8 (H) 4.8 - 5.6 %    eAG 177 mg/dL   CBC with Auto Differential    Collection Time: 10/31/22 12:38 PM   Result Value Ref Range    WBC 4.5 4.3 - 11.1 K/uL    RBC 3.05 (L) 4.05 - 5.2 M/uL    Hemoglobin 9.0 (L) 11.7 - 15.4 g/dL    Hematocrit 29.8 (L) 35.8 - 46.3 %    MCV 97.7 82 - 102 FL    MCH 29.5 26.1 - 32.9 PG    MCHC 30.2 (L) 31.4 - 35.0 g/dL    RDW 15.9 (H) 11.9 - 14.6 %    Platelets 150 398 - 875 K/uL    MPV 10.6 9.4 - 12.3 FL    nRBC 0.00 0.0 - 0.2 K/uL    Differential Type AUTOMATED      Seg Neutrophils 65 43 - 78 %    Lymphocytes 22 13 - 44 %    Monocytes 11 4.0 - 12.0 %    Eosinophils % 2 0.5 - 7.8 %    Basophils 0 0.0 - 2.0 %    Immature Granulocytes 0 0.0 - 5.0 %    Segs Absolute 2.9 1.7 - 8.2 K/UL    Absolute Lymph # 1.0 0.5 - 4.6 K/UL    Absolute Mono # 0.5 0.1 - 1.3 K/UL    Absolute Eos # 0.1 0.0 - 0.8 K/UL    Basophils Absolute 0.0 0.0 - 0.2 K/UL    Absolute Immature Granulocyte 0.0 0.0 - 0.5 K/UL Comprehensive Metabolic Panel    Collection Time: 10/31/22 12:38 PM   Result Value Ref Range    Sodium 135 133 - 143 mmol/L    Potassium 5.5 (H) 3.5 - 5.1 mmol/L    Chloride 102 101 - 110 mmol/L    CO2 28 21 - 32 mmol/L    Anion Gap 5 2 - 11 mmol/L    Glucose 263 (H) 65 - 100 mg/dL    BUN 30 (H) 8 - 23 MG/DL    Creatinine 1.80 (H) 0.6 - 1.0 MG/DL    Est, Glom Filt Rate 30 (L) >60 ml/min/1.73m2    Calcium 9.0 8.3 - 10.4 MG/DL    Total Bilirubin 0.4 0.2 - 1.1 MG/DL    ALT 12 12 - 65 U/L    AST 16 15 - 37 U/L    Alk Phosphatase 95 50 - 136 U/L    Total Protein 7.6 6.3 - 8.2 g/dL    Albumin 2.7 (L) 3.2 - 4.6 g/dL    Globulin 4.9 (H) 2.8 - 4.5 g/dL    Albumin/Globulin Ratio 0.6 0.4 - 1.6     Brain Natriuretic Peptide    Collection Time: 10/31/22 12:38 PM   Result Value Ref Range    NT Pro-BNP 4,892 (H) 5 - 125 PG/ML   Transthoracic echocardiogram (TTE) complete with contrast, bubble, strain, and 3D PRN    Collection Time: 11/11/22  8:42 AM   Result Value Ref Range    LV ESV A4C 86 mL    LV EDV A4C 136 mL    LV ESV A2C 75 mL    LV EDV A2C 128 mL    LVOT Peak Velocity 0.7 m/s    LVPWd 1.2 (A) 0.6 - 0.9 cm    LVOT Peak Gradient 2 mmHg    IVSd 1.2 (A) 0.6 - 0.9 cm    LVIDs 3.7 cm    LVIDd 4.4 3.9 - 5.3 cm    EF BP 39 (A) 55 - 100 %    LV Ejection Fraction A2C 41 %    LV Ejection Fraction A4C 37 %    RVIDd 2.8 cm    LA Diameter 3.8 cm    AV Peak Gradient 5 mmHg    AV Peak Velocity 1.2 m/s    Aortic Root 2.9 cm    Body Surface Area 2.33 m2    Fractional Shortening 2D 16 28 - 44 %    LV ESV Index A4C 39 mL/m2    LV EDV Index A4C 61 mL/m2    LV ESV Index A2C 34 mL/m2    LV EDV Index A2C 58 mL/m2    LVIDd Index 1.98 cm/m2    LVIDs Index 1.67 cm/m2    LV RWT Ratio 0.55     LV Mass 2D 191.3 (A) 67 - 162 g    LV Mass 2D Index 86.2 43 - 95 g/m2    LA Size Index 1.71 cm/m2    LA/AO Root Ratio 1.31     Ao Root Index 1.31 cm/m2    AV Velocity Ratio 0.58     LA Volume BP 42 22 - 52 mL    LA Volume Index BP 19 16 - 34 ml/m2    RV Basal Dimension 3.1 cm    RV Mid Dimension 1.8 cm    TAPSE 1.8 1.7 cm    Left Ventricular Ejection Fraction 38     LVEF MODALITY ECHO      Lab Results   Component Value Date/Time    CHOL 155 05/20/2021 10:09 AM    HDL 49 05/20/2021 10:09 AM    VLDL 23 05/20/2021 10:09 AM     Results for orders placed or performed in visit on 11/21/22   EKG 12 lead    Impression    Sinus  Rhythm  - occasional PAC     # PACs = 1.  -Right bundle branch block with left axis -bifascicular block. Voltage criteria for LVH  (R(aVL) exceeds 1.26 mV). ABNORMAL        ASSESSMENT and Poncho Pitch was seen today for cardiomyopathy. Diagnoses and all orders for this visit:    NICM (nonischemic cardiomyopathy) ([facility] [address]): Moderate reduction in LV EF on recent echo. Switch Altace 10 mg bid to Entresto. Continue Coreg.  -     EKG 12 lead  -     sacubitril-valsartan (ENTRESTO) 49-51 MG per tablet; Take 1 tablet by mouth 2 times daily    Essential hypertension:Elevated above goal.Continue Hygroton and Coreg. Switch Altace to Entresto. Add Norvasc in the future if necessary.  -     sacubitril-valsartan (ENTRESTO) 49-51 MG per tablet; Take 1 tablet by mouth 2 times daily        Disposition:    Return in about 4 weeks (around 12/19/2022) for Follow up after Med change.                 Oanh Kennedy MD  11/21/2022  1:17 PM

## 2022-11-23 ENCOUNTER — TELEPHONE (OUTPATIENT)
Dept: CARDIOLOGY CLINIC | Age: 67
End: 2022-11-23

## 2022-11-23 NOTE — TELEPHONE ENCOUNTER
Per last office note with Dr. Ashley Farrar, dylon Ivory Kayley.  Called and spoke with pharmacist at [facility] at informed him of the change

## 2022-11-23 NOTE — TELEPHONE ENCOUNTER
[facility] pharmacy is calling about the medication entresto, are we DC the ropinirole and starting the entresto. Please call when you can.

## 2022-12-06 ENCOUNTER — TELEPHONE (OUTPATIENT)
Dept: FAMILY MEDICINE CLINIC | Facility: CLINIC | Age: 67
End: 2022-12-06

## 2022-12-14 RX ORDER — RAMIPRIL 10 MG/1
CAPSULE ORAL
Qty: 180 CAPSULE | Refills: 3 | Status: SHIPPED | OUTPATIENT
Start: 2022-12-14

## 2022-12-16 ENCOUNTER — TELEPHONE (OUTPATIENT)
Dept: FAMILY MEDICINE CLINIC | Facility: CLINIC | Age: 67
End: 2022-12-16

## 2022-12-16 DIAGNOSIS — Z79.4 TYPE 2 DIABETES MELLITUS WITH HYPERGLYCEMIA, WITH LONG-TERM CURRENT USE OF INSULIN (HCC): Primary | ICD-10-CM

## 2022-12-16 DIAGNOSIS — I10 ESSENTIAL HYPERTENSION: ICD-10-CM

## 2022-12-16 DIAGNOSIS — E11.65 TYPE 2 DIABETES MELLITUS WITH HYPERGLYCEMIA, WITH LONG-TERM CURRENT USE OF INSULIN (HCC): Primary | ICD-10-CM

## 2022-12-16 DIAGNOSIS — R26.81 GAIT INSTABILITY: ICD-10-CM

## 2022-12-16 NOTE — TELEPHONE ENCOUNTER
Pt  Author Petties called asking if we can put in [address] orders for pt. States that pt is in [address] and pt is wanting to come home.

## 2022-12-17 ENCOUNTER — HOME HEALTH ADMISSION (OUTPATIENT)
Dept: HOME HEALTH SERVICES | Facility: HOME HEALTH | Age: 67
End: 2022-12-17

## 2022-12-23 ENCOUNTER — OFFICE VISIT (OUTPATIENT)
Dept: FAMILY MEDICINE CLINIC | Facility: CLINIC | Age: 67
End: 2022-12-23

## 2022-12-23 VITALS
BODY MASS INDEX: 42.28 KG/M2 | DIASTOLIC BLOOD PRESSURE: 71 MMHG | TEMPERATURE: 97.6 F | SYSTOLIC BLOOD PRESSURE: 123 MMHG | RESPIRATION RATE: 18 BRPM | OXYGEN SATURATION: 95 % | HEART RATE: 94 BPM | HEIGHT: 66 IN

## 2022-12-23 DIAGNOSIS — M51.36 DDD (DEGENERATIVE DISC DISEASE), LUMBAR: ICD-10-CM

## 2022-12-23 DIAGNOSIS — R26.81 GAIT INSTABILITY: ICD-10-CM

## 2022-12-23 DIAGNOSIS — I10 ESSENTIAL HYPERTENSION: ICD-10-CM

## 2022-12-23 DIAGNOSIS — E08.42 DIABETIC POLYNEUROPATHY ASSOCIATED WITH DIABETES MELLITUS DUE TO UNDERLYING CONDITION (HCC): ICD-10-CM

## 2022-12-23 DIAGNOSIS — C67.9 MALIGNANT NEOPLASM OF URINARY BLADDER, UNSPECIFIED SITE (HCC): ICD-10-CM

## 2022-12-23 DIAGNOSIS — E11.65 TYPE 2 DIABETES MELLITUS WITH HYPERGLYCEMIA, WITH LONG-TERM CURRENT USE OF INSULIN (HCC): ICD-10-CM

## 2022-12-23 DIAGNOSIS — I50.22 CHRONIC SYSTOLIC (CONGESTIVE) HEART FAILURE (HCC): ICD-10-CM

## 2022-12-23 DIAGNOSIS — R29.6 FREQUENT FALLS: Primary | ICD-10-CM

## 2022-12-23 DIAGNOSIS — Z79.4 TYPE 2 DIABETES MELLITUS WITH HYPERGLYCEMIA, WITH LONG-TERM CURRENT USE OF INSULIN (HCC): ICD-10-CM

## 2022-12-24 NOTE — PROGRESS NOTES
Subjective:      Patient ID: Nara Murphy is a 75 y.o. female. HPI  Patient comes in today for follow-up after recent stay in a rehab facility and was discharged this past Tuesday. Home health is already been out to evaluate the patient as she continues to have some weakness in both lower extremities. Physical and Occupational Therapy will be working with the patient according to her and her . Still was very weak and had a fall again while at the rehab facility and was taken to the emergency room 3 days ago. CT scans are negative for any acute injury. No other new concerns expressed today. They are monitoring her blood pressure. Past Medical History:   Diagnosis Date    Arthritis     osteo    Bladder cancer ([facility] [address] 2010    Cancer ([facility] [address] x 4 years ago     right breast lumpectomy- chemo and radiation    Chronic pain     De Quervain thyroiditis 7/29/2013    Depression 7/29/2013    Diabetes ([facility] [address] no problems with low sugars    type 2 - oral agents. average BS- 150's         Fibromyalgia     Fibromyalgia 7/29/2013    GERD (gastroesophageal reflux disease)     Headache(784.0)     bad headaches    Hypertension     Insomnia 7/29/2013    Knee pain 7/29/2013    Osteoarthritis 7/29/2013    Panic disorder     has not had problems with for at least a year    Stroke [facility])     Vitamin D deficiency 7/29/2013       Allergies   Allergen Reactions    Amoxicillin Other (See Comments)     whelps    Cephalexin Other (See Comments)     Whelps    Duloxetine Other (See Comments)     seizures    Paroxetine Hcl Other (See Comments)     Did not tolerate well    Sulfa Antibiotics Other (See Comments)     whelps    Sumatriptan Other (See Comments)    Adhesive Tape Rash     Tape and Band-Aid caused redness. May use paper tape.     Topiramate Nausea And Vomiting       Current Outpatient Medications   Medication Sig Dispense Refill    ramipril (ALTACE) 10 MG capsule TAKE 1 CAPSULE TWICE DAILY 180 capsule 3    oxyCODONE HCl (OXY-IR) 10 MG immediate release tablet       vitamin B-12 (CYANOCOBALAMIN) 100 MCG tablet Take 50 mcg by mouth daily      sacubitril-valsartan (ENTRESTO) 49-51 MG per tablet Take 1 tablet by mouth 2 times daily 60 tablet 5    clotrimazole-betamethasone (LOTRISONE) 1-0.05 % cream Apply topically 2 times daily.  45 g 3    promethazine (PHENERGAN) 25 MG tablet Take 1 tablet by mouth every 8 hours as needed for Nausea 90 tablet 3    chlorthalidone (HYGROTEN) 50 MG tablet Take 1 tablet by mouth daily 30 tablet 6    LANTUS SOLOSTAR 100 UNIT/ML injection pen Inject 25 Units into the skin nightly 15 Adjustable Dose Pre-filled Pen Syringe 5    tiZANidine (ZANAFLEX) 4 MG tablet Take 1 tablet by mouth 3 times daily as needed (back pain) 30 tablet 0    rosuvastatin (CRESTOR) 10 MG tablet TAKE 1 TABLET EVERY NIGHT 90 tablet 1    venlafaxine (EFFEXOR XR) 37.5 MG extended release capsule Take 1 capsule by mouth daily 30 capsule 11    omeprazole (PRILOSEC) 40 MG delayed release capsule Take 1 capsule by mouth daily 30 capsule 5    aspirin 325 MG tablet Take 325 mg by mouth daily      butalbital-acetaminophen-caffeine (FIORICET, ESGIC) -40 MG per tablet Take 1 tablet by mouth every 6 hours as needed      carvedilol (COREG) 25 MG tablet Take 25 mg by mouth 2 times daily (with meals)      diclofenac (VOLTAREN) 50 MG EC tablet Take by mouth 2 times daily      divalproex (DEPAKOTE ER) 500 MG extended release tablet Take 500 mg by mouth 2 times daily      EPINEPHrine (EPIPEN) 0.3 MG/0.3ML SOAJ injection Inject 0.3 mg into the muscle once as needed      folic acid (FOLVITE) 1 MG tablet Take 1 mg by mouth daily      glimepiride (AMARYL) 4 MG tablet Take 4 mg by mouth      lidocaine-prilocaine (EMLA) 2.5-2.5 % cream Apply topically as needed      loratadine (CLARITIN) 10 MG tablet Take 10 mg by mouth daily      nitroGLYCERIN (NITROSTAT) 0.4 MG SL tablet Place 0.4 mg under the tongue      rOPINIRole (REQUIP) 4 MG tablet Take 4 mg by mouth      traZODone (DESYREL) 100 MG tablet Take 100 mg by mouth      gabapentin (NEURONTIN) 300 MG capsule 1 po qam , and 2 po qhs. (Patient taking differently: 2 po qam , and 3 po qhs.) 90 capsule 5    magnesium oxide (MAG-OX) 400 MG tablet Take 400 mg by mouth 2 times daily (Patient not taking: Reported on 11/21/2022)       No current facility-administered medications for this visit. Social History     Tobacco Use    Smoking status: Never    Smokeless tobacco: Never   Substance Use Topics    Alcohol use: No     Alcohol/week: 0.0 standard drinks    Drug use: Yes     Types: Prescription     Review of Systems   Constitutional:  Positive for fatigue. Musculoskeletal:  Positive for arthralgias. Neurological:  Positive for weakness. Blood pressure 123/71, pulse 94, temperature 97.6 °F (36.4 °C), temperature source Temporal, resp. rate 18, height 5' 5.5\" (1.664 m), SpO2 95 %. Objective:   Physical Exam  Vitals reviewed. Constitutional:       General: She is not in acute distress. Appearance: Normal appearance. Cardiovascular:      Rate and Rhythm: Normal rate and regular rhythm. Pulses: Normal pulses. Pulmonary:      Effort: Pulmonary effort is normal.      Breath sounds: Normal breath sounds. Neurological:      General: No focal deficit present. Mental Status: She is alert and oriented to person, place, and time.        Assessment / Plan:    Fredy Ortiz was seen today for follow-up from hospital.    Diagnoses and all orders for this visit:    Frequent falls    Chronic systolic (congestive) heart failure    Type 2 diabetes mellitus with hyperglycemia, with long-term current use of insulin (HCC)    Essential hypertension    Gait instability    Malignant neoplasm of urinary bladder, unspecified site ([address]    DDD (degenerative disc disease), lumbar    Diabetic polyneuropathy associated with diabetes mellitus due to underlying condition ([address]       Continue with home health as planned and will follow-up in a month to reassess how she is doing. Follow-up and Dispositions    Return in about 1 month (around 1/23/2023), or if symptoms worsen or fail to improve. Dictated using voice recognition software.  Proofread, but unrecognized errors may exist.

## 2023-01-19 DIAGNOSIS — I42.8 NICM (NONISCHEMIC CARDIOMYOPATHY) (HCC): ICD-10-CM

## 2023-01-19 DIAGNOSIS — I10 ESSENTIAL HYPERTENSION: ICD-10-CM

## 2023-01-19 RX ORDER — DOXYCYCLINE 100 MG/1
TABLET ORAL
Qty: 20 TABLET | Refills: 0 | Status: SHIPPED | OUTPATIENT
Start: 2023-01-19

## 2023-01-19 RX ORDER — OMEPRAZOLE 40 MG/1
CAPSULE, DELAYED RELEASE ORAL
Qty: 30 CAPSULE | Refills: 5 | Status: SHIPPED | OUTPATIENT
Start: 2023-01-19

## 2023-01-25 ENCOUNTER — CARE COORDINATION (OUTPATIENT)
Dept: CARE COORDINATION | Facility: CLINIC | Age: 68
End: 2023-01-25

## 2023-01-25 NOTE — CARE COORDINATION
Ambulatory Care Coordination Note  1/25/2023    ACC: Melissa Rubio, RN    ACM spoke w/ pt's  regarding CCM services, he is agreeable to services. Pt is current w/ [facility], has urinary catheter.  reports pt has been a/o however today he reports pt is confused as to day and time, and made statement regarding needing him o stand her up to feed her because of the tubes in her stomach. Dicussed upcoming OV w/ urology with plans for cystocopy. ACM reviewed s/s UTI w/  who verbalized understanding. ACM encouraged  to notify [facility] regarding pt sxs, discussed availability of [place] 24/7. Provided him w/ this ACM contact information. He is agreeable to ACM f/u and will outreach with any questions prior to next scheduled call. Offered patient enrollment in the Remote Patient Monitoring (RPM) program for in-home monitoring: NA.      Prior to Admission medications    Medication Sig Start Date End Date Taking? Authorizing Provider   sacubitril-valsartan (ENTRESTO) 49-51 MG per tablet Take 1 tablet by mouth 2 times daily 1/19/23   Lizabeth Garcia MD   omeprazole (PRILOSEC) 40 MG delayed release capsule TAKE ONE CAPSULE BY MOUTH EVERY DAY 1/19/23   Evy Burnett MD   doxycycline monohydrate (ADOXA) 100 MG tablet TAKE ONE TABLET BY MOUTH TWICE A DAY FOR 10 DAYS 1/19/23   Evy Burnett MD   ramipril (ALTACE) 10 MG capsule TAKE 1 CAPSULE TWICE DAILY 12/14/22   Evy Burnett MD   oxyCODONE HCl (OXY-IR) 10 MG immediate release tablet  11/9/22   Historical Provider, MD   vitamin B-12 (CYANOCOBALAMIN) 100 MCG tablet Take 50 mcg by mouth daily    Historical Provider, MD   clotrimazole-betamethasone (LOTRISONE) 1-0.05 % cream Apply topically 2 times daily.  11/14/22   Evy Burnett MD   promethazine (PHENERGAN) 25 MG tablet Take 1 tablet by mouth every 8 hours as needed for Nausea 11/1/22   Evy Burnett MD   chlorthalidone (HYGROTEN) 50 MG tablet Take 1 tablet by mouth daily 10/31/22   Liz Ochoa MD   LANTUS SOLOSTAR 100 UNIT/ML injection pen Inject 25 Units into the skin nightly 10/31/22   Liz Ochoa MD   tiZANidine (ZANAFLEX) 4 MG tablet Take 1 tablet by mouth 3 times daily as needed (back pain) 10/31/22   Liz Ochoa MD   rosuvastatin (CRESTOR) 10 MG tablet TAKE 1 TABLET EVERY NIGHT 9/30/22   Liz Ochoa MD   gabapentin (NEURONTIN) 300 MG capsule 1 po qam , and 2 po qhs.   Patient taking differently: 2 po qam , and 3 po qhs. 6/14/22 12/14/22  Liz Ochoa MD   venlafaxine (EFFEXOR XR) 37.5 MG extended release capsule Take 1 capsule by mouth daily 6/14/22 6/14/23  Liz Ochoa MD   aspirin 325 MG tablet Take 325 mg by mouth daily    Ar Automatic Reconciliation   butalbital-acetaminophen-caffeine (FIORICET, ESGIC) -40 MG per tablet Take 1 tablet by mouth every 6 hours as needed 1/21/22   Ar Automatic Reconciliation   carvedilol (COREG) 25 MG tablet Take 25 mg by mouth 2 times daily (with meals) 3/31/22   Ar Automatic Reconciliation   diclofenac (VOLTAREN) 50 MG EC tablet Take by mouth 2 times daily    Ar Automatic Reconciliation   divalproex (DEPAKOTE ER) 500 MG extended release tablet Take 500 mg by mouth 2 times daily 8/12/21   Ar Automatic Reconciliation   EPINEPHrine (EPIPEN) 0.3 MG/0.3ML SOAJ injection Inject 0.3 mg into the muscle once as needed    Ar Automatic Reconciliation   folic acid (FOLVITE) 1 MG tablet Take 1 mg by mouth daily 3/14/22   Ar Automatic Reconciliation   glimepiride (AMARYL) 4 MG tablet Take 4 mg by mouth 10/20/21   Ar Automatic Reconciliation   lidocaine-prilocaine (EMLA) 2.5-2.5 % cream Apply topically as needed 1/22/22   Ar Automatic Reconciliation   loratadine (CLARITIN) 10 MG tablet Take 10 mg by mouth daily    Ar Automatic Reconciliation   magnesium oxide (MAG-OX) 400 MG tablet Take 400 mg by mouth 2 times daily  Patient not taking: Reported on 11/21/2022    Ar Automatic Reconciliation nitroGLYCERIN (NITROSTAT) 0.4 MG SL tablet Place 0.4 mg under the tongue 7/14/21   Ar Automatic Reconciliation   rOPINIRole (REQUIP) 4 MG tablet Take 4 mg by mouth 3/14/22   Ar Automatic Reconciliation   traZODone (DESYREL) 100 MG tablet Take 100 mg by mouth 3/14/22   Ar Automatic Reconciliation       Future Appointments   Date Time Provider [facility]   1/27/2023  3:15 PM Merlin Knack, MD FPA [facility]

## 2023-01-27 ENCOUNTER — OFFICE VISIT (OUTPATIENT)
Dept: FAMILY MEDICINE CLINIC | Facility: CLINIC | Age: 68
End: 2023-01-27

## 2023-01-27 VITALS
OXYGEN SATURATION: 97 % | HEART RATE: 76 BPM | DIASTOLIC BLOOD PRESSURE: 69 MMHG | TEMPERATURE: 97.5 F | HEIGHT: 66 IN | SYSTOLIC BLOOD PRESSURE: 110 MMHG | RESPIRATION RATE: 16 BRPM | BODY MASS INDEX: 36.32 KG/M2 | WEIGHT: 226 LBS

## 2023-01-27 DIAGNOSIS — E11.65 TYPE 2 DIABETES MELLITUS WITH HYPERGLYCEMIA, WITH LONG-TERM CURRENT USE OF INSULIN (HCC): ICD-10-CM

## 2023-01-27 DIAGNOSIS — Z79.4 TYPE 2 DIABETES MELLITUS WITH HYPERGLYCEMIA, WITH LONG-TERM CURRENT USE OF INSULIN (HCC): ICD-10-CM

## 2023-01-27 DIAGNOSIS — E08.42 DIABETIC POLYNEUROPATHY ASSOCIATED WITH DIABETES MELLITUS DUE TO UNDERLYING CONDITION (HCC): ICD-10-CM

## 2023-01-27 DIAGNOSIS — C67.9 MALIGNANT NEOPLASM OF URINARY BLADDER, UNSPECIFIED SITE (HCC): ICD-10-CM

## 2023-01-27 DIAGNOSIS — I10 ESSENTIAL HYPERTENSION: Primary | ICD-10-CM

## 2023-01-27 DIAGNOSIS — E66.01 SEVERE OBESITY (BMI 35.0-39.9) WITH COMORBIDITY (HCC): ICD-10-CM

## 2023-01-27 DIAGNOSIS — D64.9 ANEMIA, UNSPECIFIED TYPE: ICD-10-CM

## 2023-01-27 DIAGNOSIS — N18.30 STAGE 3 CHRONIC KIDNEY DISEASE, UNSPECIFIED WHETHER STAGE 3A OR 3B CKD (HCC): ICD-10-CM

## 2023-01-27 DIAGNOSIS — R20.2 PARESTHESIA: ICD-10-CM

## 2023-01-27 DIAGNOSIS — R25.1 TREMOR: ICD-10-CM

## 2023-01-27 DIAGNOSIS — I50.22 CHRONIC SYSTOLIC (CONGESTIVE) HEART FAILURE (HCC): ICD-10-CM

## 2023-01-27 LAB
BASOPHILS # BLD: 0 K/UL (ref 0–0.2)
BASOPHILS NFR BLD: 1 % (ref 0–2)
DIFFERENTIAL METHOD BLD: ABNORMAL
EOSINOPHIL # BLD: 0.3 K/UL (ref 0–0.8)
EOSINOPHIL NFR BLD: 4 % (ref 0.5–7.8)
ERYTHROCYTE [DISTWIDTH] IN BLOOD BY AUTOMATED COUNT: 15.1 % (ref 11.9–14.6)
HCT VFR BLD AUTO: 30.8 % (ref 35.8–46.3)
HGB BLD-MCNC: 9.4 G/DL (ref 11.7–15.4)
IMM GRANULOCYTES # BLD AUTO: 0.2 K/UL (ref 0–0.5)
IMM GRANULOCYTES NFR BLD AUTO: 2 % (ref 0–5)
LYMPHOCYTES # BLD: 1.2 K/UL (ref 0.5–4.6)
LYMPHOCYTES NFR BLD: 16 % (ref 13–44)
MCH RBC QN AUTO: 28.8 PG (ref 26.1–32.9)
MCHC RBC AUTO-ENTMCNC: 30.5 G/DL (ref 31.4–35)
MCV RBC AUTO: 94.5 FL (ref 82–102)
MONOCYTES # BLD: 0.7 K/UL (ref 0.1–1.3)
MONOCYTES NFR BLD: 9 % (ref 4–12)
NEUTS SEG # BLD: 5.3 K/UL (ref 1.7–8.2)
NEUTS SEG NFR BLD: 68 % (ref 43–78)
NRBC # BLD: 0 K/UL (ref 0–0.2)
PLATELET # BLD AUTO: 242 K/UL (ref 150–450)
PMV BLD AUTO: 9.9 FL (ref 9.4–12.3)
RBC # BLD AUTO: 3.26 M/UL (ref 4.05–5.2)
WBC # BLD AUTO: 7.8 K/UL (ref 4.3–11.1)

## 2023-01-27 RX ORDER — BLOOD SUGAR DIAGNOSTIC
STRIP MISCELLANEOUS
Qty: 100 EACH | Refills: 12 | Status: SHIPPED | OUTPATIENT
Start: 2023-01-27

## 2023-01-27 RX ORDER — BLOOD-GLUCOSE METER
EACH MISCELLANEOUS
Qty: 1 KIT | Refills: 0 | Status: SHIPPED | OUTPATIENT
Start: 2023-01-27

## 2023-01-27 RX ORDER — LANCETS
EACH MISCELLANEOUS
Qty: 100 EACH | Refills: 12 | Status: SHIPPED | OUTPATIENT
Start: 2023-01-27

## 2023-01-27 ASSESSMENT — ENCOUNTER SYMPTOMS
SHORTNESS OF BREATH: 0
BACK PAIN: 1

## 2023-01-27 ASSESSMENT — PATIENT HEALTH QUESTIONNAIRE - PHQ9
SUM OF ALL RESPONSES TO PHQ QUESTIONS 1-9: 0
SUM OF ALL RESPONSES TO PHQ9 QUESTIONS 1 & 2: 0
1. LITTLE INTEREST OR PLEASURE IN DOING THINGS: 0
2. FEELING DOWN, DEPRESSED OR HOPELESS: 0

## 2023-01-27 NOTE — PROGRESS NOTES
Subjective:      Patient ID: Clay Ace is a 75 y.o. female. HPI  Patient comes in today for follow-up on her blood pressure, diabetes and history of congestive heart failure and bladder cancer. She has a follow-up in the next month with urology and cardiology. Patient unfortunately had to go to the ER since I have seen her earlier this month and apparently was noted to have an acute kidney injury. Actually stopped her gabapentin thinking that was contributing to some of her symptoms and possible confusion. Still does have a lot of pain but is seen by pain management and on oxycodone from that. Does have some numbness and pain in both of her legs consistent with neuropathy. Hemoglobin was noted to be lower and potassium was low as well. Still has a lot of weakness but is getting home health physical therapy. No increased shortness of breath noted. 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 average BS- 150's         Fibromyalgia     Fibromyalgia 7/29/2013    GERD (gastroesophageal reflux disease)     Headache(784.0)     bad headaches    Hypertension     Insomnia 7/29/2013    Knee pain 7/29/2013    Osteoarthritis 7/29/2013    Panic disorder     has not had problems with for at least a year    Stroke [facility])     Vitamin D deficiency 7/29/2013       Allergies   Allergen Reactions    Amoxicillin Other (See Comments)     whelps    Cephalexin Other (See Comments)     Whelps    Duloxetine Other (See Comments)     seizures    Paroxetine Hcl Other (See Comments)     Did not tolerate well    Sulfa Antibiotics Other (See Comments)     whelps    Sumatriptan Other (See Comments)    Adhesive Tape Rash     Tape and Band-Aid caused redness.  May use paper tape. Topiramate Nausea And Vomiting       Current Outpatient Medications   Medication Sig Dispense Refill    Blood Glucose Monitoring Suppl (ACCU-CHEK WYATT PLUS) w/Device KIT Check daily for diabetes mellitus E11.9 1 kit 0    blood glucose test strips (ACCU-CHEK WYATT PLUS) strip Check daily for diabetes mellitus E11.9 100 each 12    Accu-Chek Softclix Lancets MISC Check daily for diabetes mellitus E11.9 100 each 12    sacubitril-valsartan (ENTRESTO) 49-51 MG per tablet Take 1 tablet by mouth 2 times daily 180 tablet 3    omeprazole (PRILOSEC) 40 MG delayed release capsule TAKE ONE CAPSULE BY MOUTH EVERY DAY 30 capsule 5    doxycycline monohydrate (ADOXA) 100 MG tablet TAKE ONE TABLET BY MOUTH TWICE A DAY FOR 10 DAYS 20 tablet 0    ramipril (ALTACE) 10 MG capsule TAKE 1 CAPSULE TWICE DAILY 180 capsule 3    oxyCODONE HCl (OXY-IR) 10 MG immediate release tablet       vitamin B-12 (CYANOCOBALAMIN) 100 MCG tablet Take 50 mcg by mouth daily      clotrimazole-betamethasone (LOTRISONE) 1-0.05 % cream Apply topically 2 times daily. 45 g 3    promethazine (PHENERGAN) 25 MG tablet Take 1 tablet by mouth every 8 hours as needed for Nausea 90 tablet 3    chlorthalidone (HYGROTEN) 50 MG tablet Take 1 tablet by mouth daily 30 tablet 6    LANTUS SOLOSTAR 100 UNIT/ML injection pen Inject 25 Units into the skin nightly 15 Adjustable Dose Pre-filled Pen Syringe 5    tiZANidine (ZANAFLEX) 4 MG tablet Take 1 tablet by mouth 3 times daily as needed (back pain) 30 tablet 0    rosuvastatin (CRESTOR) 10 MG tablet TAKE 1 TABLET EVERY NIGHT 90 tablet 1    gabapentin (NEURONTIN) 300 MG capsule 1 po qam , and 2 po qhs.  (Patient taking differently: 2 po qam , and 3 po qhs.) 90 capsule 5    venlafaxine (EFFEXOR XR) 37.5 MG extended release capsule Take 1 capsule by mouth daily 30 capsule 11    aspirin 325 MG tablet Take 325 mg by mouth daily      butalbital-acetaminophen-caffeine (FIORICET, ESGIC) -40 MG per tablet Take 1 tablet by mouth every 6 hours as needed      carvedilol (COREG) 25 MG tablet Take 25 mg by mouth 2 times daily (with meals)      diclofenac (VOLTAREN) 50 MG EC tablet Take by mouth 2 times daily      divalproex (DEPAKOTE ER) 500 MG extended release tablet Take 500 mg by mouth 2 times daily      EPINEPHrine (EPIPEN) 0.3 MG/0.3ML SOAJ injection Inject 0.3 mg into the muscle once as needed      folic acid (FOLVITE) 1 MG tablet Take 1 mg by mouth daily      glimepiride (AMARYL) 4 MG tablet Take 4 mg by mouth      lidocaine-prilocaine (EMLA) 2.5-2.5 % cream Apply topically as needed      loratadine (CLARITIN) 10 MG tablet Take 10 mg by mouth daily      magnesium oxide (MAG-OX) 400 MG tablet Take 400 mg by mouth 2 times daily      nitroGLYCERIN (NITROSTAT) 0.4 MG SL tablet Place 0.4 mg under the tongue      rOPINIRole (REQUIP) 4 MG tablet Take 4 mg by mouth      traZODone (DESYREL) 100 MG tablet Take 100 mg by mouth       No current facility-administered medications for this visit. Social History     Tobacco Use    Smoking status: Never    Smokeless tobacco: Never   Substance Use Topics    Alcohol use: No     Alcohol/week: 0.0 standard drinks    Drug use: Yes     Types: Prescription     Review of Systems   Constitutional:  Positive for fatigue. Respiratory:  Negative for shortness of breath. Cardiovascular:  Negative for chest pain and leg swelling. Musculoskeletal:  Positive for arthralgias and back pain. Neurological:  Positive for weakness. Blood pressure 110/69, pulse 76, temperature 97.5 °F (36.4 °C), temperature source Temporal, resp. rate 16, height 5' 5.5\" (1.664 m), weight 226 lb (102.5 kg), SpO2 97 %. Objective:   Physical Exam  Vitals reviewed. Constitutional:       General: She is not in acute distress. Appearance: Normal appearance. Cardiovascular:      Rate and Rhythm: Normal rate and regular rhythm. Pulses: Normal pulses.    Pulmonary:      Effort: Pulmonary effort is normal. Breath sounds: Normal breath sounds. Neurological:      General: No focal deficit present. Mental Status: She is alert and oriented to person, place, and time. Assessment / Plan:       Celeste Reyes was seen today for hypertension and diabetes. Diagnoses and all orders for this visit:    Essential hypertension  -     Basic Metabolic Panel; Future  -     CBC with Auto Differential; Future  -     CBC with Auto Differential  -     Basic Metabolic Panel    Severe obesity (BMI 35.0-39. 9) with comorbidity (HCC)    Chronic systolic (congestive) heart failure (HCC)    Malignant neoplasm of urinary bladder, unspecified site [facility])    Diabetic polyneuropathy associated with diabetes mellitus due to underlying condition ([facility])    Stage 3 chronic kidney disease, unspecified whether stage 3a or 3b CKD ([facility]  -     Basic Metabolic Panel; Future  -     Basic Metabolic Panel    Type 2 diabetes mellitus with hyperglycemia, with long-term current use of insulin ([facility])  -     Blood Glucose Monitoring Suppl (ACCU-CHEK WYATT PLUS) w/Device KIT; Check daily for diabetes mellitus E11.9  -     blood glucose test strips (ACCU-CHEK WYATT PLUS) strip; Check daily for diabetes mellitus E11.9  -     Accu-Chek Softclix Lancets MISC; Check daily for diabetes mellitus E11.9    Tremor  -     Vitamin B12; Future  -     Vitamin B12    Anemia, unspecified type  -     Vitamin B12; Future  -     Ferritin; Future  -     Ferritin  -     Vitamin B12    Paresthesia  -     Vitamin B12; Future  -     Vitamin B12   Will notify patient of test results. Continue leave off the gabapentin at this time and will await lab results. Continue with home health as directed    Follow-up and Dispositions    Return in about 1 month (around 2/27/2023), or if symptoms worsen or fail to improve. Dictated using voice recognition software.  Proofread, but unrecognized errors may exist.

## 2023-01-28 LAB
ANION GAP SERPL CALC-SCNC: 10 MMOL/L (ref 2–11)
BUN SERPL-MCNC: 23 MG/DL (ref 8–23)
CALCIUM SERPL-MCNC: 9.6 MG/DL (ref 8.3–10.4)
CHLORIDE SERPL-SCNC: 103 MMOL/L (ref 101–110)
CO2 SERPL-SCNC: 23 MMOL/L (ref 21–32)
CREAT SERPL-MCNC: 2.2 MG/DL (ref 0.6–1)
FERRITIN SERPL-MCNC: 191 NG/ML (ref 8–388)
GLUCOSE SERPL-MCNC: 224 MG/DL (ref 65–100)
POTASSIUM SERPL-SCNC: 5.5 MMOL/L (ref 3.5–5.1)
SODIUM SERPL-SCNC: 136 MMOL/L (ref 133–143)
VIT B12 SERPL-MCNC: 693 PG/ML (ref 193–986)

## 2023-01-30 NOTE — RESULT ENCOUNTER NOTE
Please tell patient that iron level is good at 191, B12 level is also normal.  Blood sugar is 224, creatinine is a little higher at 2.2. May need to consider referral to nephrology or kidney specialist.  Let me know if she has seen one before but make sure drinking plenty of fluids. Hemoglobin is better at 9. 4.

## 2023-02-06 ENCOUNTER — CARE COORDINATION (OUTPATIENT)
Dept: CARE COORDINATION | Facility: CLINIC | Age: 68
End: 2023-02-06

## 2023-02-15 ENCOUNTER — CARE COORDINATION (OUTPATIENT)
Dept: CARE COORDINATION | Facility: CLINIC | Age: 68
End: 2023-02-15

## 2023-02-15 ENCOUNTER — TELEPHONE (OUTPATIENT)
Dept: FAMILY MEDICINE CLINIC | Facility: CLINIC | Age: 68
End: 2023-02-15

## 2023-02-15 NOTE — CARE COORDINATION
Ambulatory Care Coordination Note  2/15/2023    Patient Current Location:  Home: [address] [place] [address]     ACM contacted the family by telephone. Verified name and  with family as identifiers. Provided introduction to self, and explanation of the ACM role. Challenges to be reviewed by the provider   Additional needs identified to be addressed with provider: No  none               Method of communication with provider: none. ACM: Truong Diaz, RN    ACM spoke w/ pt's  who reports pt got urinary catheter out, will cystoscope in April to test how well her bladder is working. Pt is using depends, is getting up to toilet for Bms, still not steady on her feet. Pt BS is good, has lost some weight. HHPT has encouraged  to have pt out of bed 3-4hrs/day, stand at kitchen sink for 30secs at a time to build endurance and muscle strength. He denies concerns at this time, no questions. ACM will fu in 2wks and follow for dc of HH.       Offered patient enrollment in the Remote Patient Monitoring (RPM) program for in-home monitoring: NA.       Goals Addressed    None         Future Appointments   Date Time Provider [facility] Winkler   2023 12:15 PM MD XENIA Bynum   2023  2:15 PM MD XENIA Bynum AMB

## 2023-02-15 NOTE — TELEPHONE ENCOUNTER
Received home health plan of care forms for patient. Need signed and returned. In nurse box up front.   LT

## 2023-02-20 ENCOUNTER — NURSE ONLY (OUTPATIENT)
Dept: FAMILY MEDICINE CLINIC | Facility: CLINIC | Age: 68
End: 2023-02-20

## 2023-02-20 ENCOUNTER — OFFICE VISIT (OUTPATIENT)
Dept: FAMILY MEDICINE CLINIC | Facility: CLINIC | Age: 68
End: 2023-02-20
Payer: MEDICARE

## 2023-02-20 VITALS
BODY MASS INDEX: 36.48 KG/M2 | RESPIRATION RATE: 16 BRPM | TEMPERATURE: 97.8 F | DIASTOLIC BLOOD PRESSURE: 74 MMHG | OXYGEN SATURATION: 96 % | HEIGHT: 66 IN | HEART RATE: 110 BPM | SYSTOLIC BLOOD PRESSURE: 134 MMHG | WEIGHT: 227 LBS

## 2023-02-20 DIAGNOSIS — C67.9 MALIGNANT NEOPLASM OF URINARY BLADDER, UNSPECIFIED SITE (HCC): ICD-10-CM

## 2023-02-20 DIAGNOSIS — Z79.4 TYPE 2 DIABETES MELLITUS WITH HYPERGLYCEMIA, WITH LONG-TERM CURRENT USE OF INSULIN (HCC): ICD-10-CM

## 2023-02-20 DIAGNOSIS — F41.9 ANXIETY: ICD-10-CM

## 2023-02-20 DIAGNOSIS — H69.81 ETD (EUSTACHIAN TUBE DYSFUNCTION), RIGHT: ICD-10-CM

## 2023-02-20 DIAGNOSIS — E11.65 TYPE 2 DIABETES MELLITUS WITH HYPERGLYCEMIA, WITH LONG-TERM CURRENT USE OF INSULIN (HCC): ICD-10-CM

## 2023-02-20 DIAGNOSIS — I10 ESSENTIAL HYPERTENSION: ICD-10-CM

## 2023-02-20 DIAGNOSIS — I50.22 CHRONIC SYSTOLIC (CONGESTIVE) HEART FAILURE (HCC): ICD-10-CM

## 2023-02-20 DIAGNOSIS — G47.00 INSOMNIA, UNSPECIFIED TYPE: ICD-10-CM

## 2023-02-20 DIAGNOSIS — E08.42 DIABETIC POLYNEUROPATHY ASSOCIATED WITH DIABETES MELLITUS DUE TO UNDERLYING CONDITION (HCC): Primary | ICD-10-CM

## 2023-02-20 PROCEDURE — G8484 FLU IMMUNIZE NO ADMIN: HCPCS | Performed by: FAMILY MEDICINE

## 2023-02-20 PROCEDURE — 1036F TOBACCO NON-USER: CPT | Performed by: FAMILY MEDICINE

## 2023-02-20 PROCEDURE — G8427 DOCREV CUR MEDS BY ELIG CLIN: HCPCS | Performed by: FAMILY MEDICINE

## 2023-02-20 PROCEDURE — 2022F DILAT RTA XM EVC RTNOPTHY: CPT | Performed by: FAMILY MEDICINE

## 2023-02-20 PROCEDURE — G8400 PT W/DXA NO RESULTS DOC: HCPCS | Performed by: FAMILY MEDICINE

## 2023-02-20 PROCEDURE — G8417 CALC BMI ABV UP PARAM F/U: HCPCS | Performed by: FAMILY MEDICINE

## 2023-02-20 PROCEDURE — 99213 OFFICE O/P EST LOW 20 MIN: CPT | Performed by: FAMILY MEDICINE

## 2023-02-20 PROCEDURE — 1090F PRES/ABSN URINE INCON ASSESS: CPT | Performed by: FAMILY MEDICINE

## 2023-02-20 PROCEDURE — 3075F SYST BP GE 130 - 139MM HG: CPT | Performed by: FAMILY MEDICINE

## 2023-02-20 PROCEDURE — 3017F COLORECTAL CA SCREEN DOC REV: CPT | Performed by: FAMILY MEDICINE

## 2023-02-20 PROCEDURE — 3078F DIAST BP <80 MM HG: CPT | Performed by: FAMILY MEDICINE

## 2023-02-20 PROCEDURE — 1123F ACP DISCUSS/DSCN MKR DOCD: CPT | Performed by: FAMILY MEDICINE

## 2023-02-20 PROCEDURE — 3046F HEMOGLOBIN A1C LEVEL >9.0%: CPT | Performed by: FAMILY MEDICINE

## 2023-02-20 RX ORDER — ROPINIROLE 4 MG/1
4 TABLET, FILM COATED ORAL NIGHTLY
Qty: 90 TABLET | Refills: 3 | Status: SHIPPED | OUTPATIENT
Start: 2023-02-20

## 2023-02-20 RX ORDER — VENLAFAXINE HYDROCHLORIDE 37.5 MG/1
37.5 CAPSULE, EXTENDED RELEASE ORAL DAILY
Qty: 30 CAPSULE | Refills: 11 | Status: SHIPPED | OUTPATIENT
Start: 2023-02-20 | End: 2024-02-20

## 2023-02-20 RX ORDER — GABAPENTIN 300 MG/1
CAPSULE ORAL
Qty: 90 CAPSULE | Refills: 5 | Status: SHIPPED | OUTPATIENT
Start: 2023-02-20 | End: 2023-10-05

## 2023-02-20 RX ORDER — CHLORTHALIDONE 50 MG/1
50 TABLET ORAL DAILY
Qty: 30 TABLET | Refills: 6 | Status: SHIPPED | OUTPATIENT
Start: 2023-02-20

## 2023-02-20 RX ORDER — GLIMEPIRIDE 4 MG/1
4 TABLET ORAL
Qty: 90 TABLET | Refills: 3 | Status: SHIPPED | OUTPATIENT
Start: 2023-02-20

## 2023-02-20 RX ORDER — TRAZODONE HYDROCHLORIDE 100 MG/1
100 TABLET ORAL NIGHTLY
Qty: 30 TABLET | Refills: 5 | Status: SHIPPED | OUTPATIENT
Start: 2023-02-20

## 2023-02-20 RX ORDER — AZITHROMYCIN 250 MG/1
250 TABLET, FILM COATED ORAL SEE ADMIN INSTRUCTIONS
Qty: 6 TABLET | Refills: 0 | Status: SHIPPED | OUTPATIENT
Start: 2023-02-20 | End: 2023-02-25

## 2023-02-20 RX ORDER — DIVALPROEX SODIUM 500 MG/1
500 TABLET, EXTENDED RELEASE ORAL 2 TIMES DAILY
Qty: 90 TABLET | Refills: 3 | Status: SHIPPED | OUTPATIENT
Start: 2023-02-20

## 2023-02-20 RX ORDER — ROSUVASTATIN CALCIUM 10 MG/1
TABLET, COATED ORAL
Qty: 90 TABLET | Refills: 3 | Status: SHIPPED | OUTPATIENT
Start: 2023-02-20

## 2023-02-20 RX ORDER — CARVEDILOL 25 MG/1
25 TABLET ORAL 2 TIMES DAILY WITH MEALS
Qty: 180 TABLET | Refills: 3 | Status: SHIPPED | OUTPATIENT
Start: 2023-02-20

## 2023-02-20 ASSESSMENT — PATIENT HEALTH QUESTIONNAIRE - PHQ9
SUM OF ALL RESPONSES TO PHQ QUESTIONS 1-9: 2
SUM OF ALL RESPONSES TO PHQ9 QUESTIONS 1 & 2: 2
2. FEELING DOWN, DEPRESSED OR HOPELESS: 1
SUM OF ALL RESPONSES TO PHQ QUESTIONS 1-9: 2
1. LITTLE INTEREST OR PLEASURE IN DOING THINGS: 1
SUM OF ALL RESPONSES TO PHQ QUESTIONS 1-9: 2
SUM OF ALL RESPONSES TO PHQ QUESTIONS 1-9: 2

## 2023-02-20 NOTE — PROGRESS NOTES
Subjective:      Patient ID: Brad Small is a 75 y.o. female. HPI  Patient comes in today for follow-up on her diabetes, hypertension. Her creatinine was elevated on the recent labs so we will recheck that today. She has improved some with home health and physical therapy. She has had some pressure in her right ear at times and recently had some sinus congestion. Past Medical History:   Diagnosis Date    Arthritis     osteo    Bladder cancer ([facility] [address] 2010    Cancer ([facility] [address] x 4 years ago     right breast lumpectomy- chemo and radiation    Chronic pain     De Quervain thyroiditis 7/29/2013    Depression 7/29/2013    Diabetes ([facility] [address] no problems with low sugars    type 2 - oral agents. average BS- 150's         Fibromyalgia     Fibromyalgia 7/29/2013    GERD (gastroesophageal reflux disease)     Headache(784.0)     bad headaches    Hypertension     Insomnia 7/29/2013    Knee pain 7/29/2013    Osteoarthritis 7/29/2013    Panic disorder     has not had problems with for at least a year    Stroke [facility])     Vitamin D deficiency 7/29/2013       Allergies   Allergen Reactions    Amoxicillin Other (See Comments)     whelps    Cephalexin Other (See Comments)     Whelps    Duloxetine Other (See Comments)     seizures    Paroxetine Hcl Other (See Comments)     Did not tolerate well    Sulfa Antibiotics Other (See Comments)     whelps    Sumatriptan Other (See Comments)    Adhesive Tape Rash     Tape and Band-Aid caused redness. May use paper tape.     Topiramate Nausea And Vomiting       Current Outpatient Medications   Medication Sig Dispense Refill    Blood Glucose Monitoring Suppl (ACCU-CHEK WYATT PLUS) w/Device KIT Check daily for diabetes mellitus E11.9 1 kit 0    blood glucose test strips (ACCU-CHEK WYATT PLUS) strip Check daily for diabetes mellitus E11.9 100 each 12    Accu-Chek Softclix Lancets MISC Check daily for diabetes mellitus E11.9 100 each 12    sacubitril-valsartan (ENTRESTO) 49-51 MG per tablet Take 1 tablet by mouth 2 times daily 180 tablet 3    omeprazole (PRILOSEC) 40 MG delayed release capsule TAKE ONE CAPSULE BY MOUTH EVERY DAY 30 capsule 5    ramipril (ALTACE) 10 MG capsule TAKE 1 CAPSULE TWICE DAILY 180 capsule 3    oxyCODONE HCl (OXY-IR) 10 MG immediate release tablet       vitamin B-12 (CYANOCOBALAMIN) 100 MCG tablet Take 50 mcg by mouth daily      clotrimazole-betamethasone (LOTRISONE) 1-0.05 % cream Apply topically 2 times daily.  45 g 3    promethazine (PHENERGAN) 25 MG tablet Take 1 tablet by mouth every 8 hours as needed for Nausea 90 tablet 3    chlorthalidone (HYGROTEN) 50 MG tablet Take 1 tablet by mouth daily 30 tablet 6    LANTUS SOLOSTAR 100 UNIT/ML injection pen Inject 25 Units into the skin nightly 15 Adjustable Dose Pre-filled Pen Syringe 5    tiZANidine (ZANAFLEX) 4 MG tablet Take 1 tablet by mouth 3 times daily as needed (back pain) 30 tablet 0    rosuvastatin (CRESTOR) 10 MG tablet TAKE 1 TABLET EVERY NIGHT 90 tablet 1    venlafaxine (EFFEXOR XR) 37.5 MG extended release capsule Take 1 capsule by mouth daily 30 capsule 11    aspirin 325 MG tablet Take 325 mg by mouth daily      butalbital-acetaminophen-caffeine (FIORICET, ESGIC) -40 MG per tablet Take 1 tablet by mouth every 6 hours as needed      carvedilol (COREG) 25 MG tablet Take 25 mg by mouth 2 times daily (with meals)      diclofenac (VOLTAREN) 50 MG EC tablet Take by mouth 2 times daily      divalproex (DEPAKOTE ER) 500 MG extended release tablet Take 500 mg by mouth 2 times daily      EPINEPHrine (EPIPEN) 0.3 MG/0.3ML SOAJ injection Inject 0.3 mg into the muscle once as needed      folic acid (FOLVITE) 1 MG tablet Take 1 mg by mouth daily      glimepiride (AMARYL) 4 MG tablet Take 4 mg by mouth      lidocaine-prilocaine (EMLA) 2.5-2.5 % cream Apply topically as needed      loratadine (CLARITIN) 10 MG tablet Take 10 mg by mouth daily      magnesium oxide (MAG-OX) 400 MG tablet Take 400 mg by mouth 2 times daily nitroGLYCERIN (NITROSTAT) 0.4 MG SL tablet Place 0.4 mg under the tongue      rOPINIRole (REQUIP) 4 MG tablet Take 4 mg by mouth      traZODone (DESYREL) 100 MG tablet Take 100 mg by mouth       No current facility-administered medications for this visit. Social History     Tobacco Use    Smoking status: Never    Smokeless tobacco: Never   Substance Use Topics    Alcohol use: No     Alcohol/week: 0.0 standard drinks    Drug use: Yes     Types: Prescription     Review of Systems   Constitutional:  Positive for fatigue. Neurological:  Positive for weakness. Blood pressure 134/74, pulse (!) 110, temperature 97.8 °F (36.6 °C), temperature source Temporal, resp. rate 16, height 5' 5.5\" (1.664 m), weight 227 lb (103 kg), SpO2 96 %. Objective:   Physical Exam  Vitals reviewed. Constitutional:       General: She is not in acute distress. Appearance: Normal appearance. HENT:      Right Ear: A middle ear effusion is present. Left Ear: A middle ear effusion is present. Nose: Congestion and rhinorrhea present. Mouth/Throat:      Mouth: Mucous membranes are moist.      Pharynx: Uvula midline. Posterior oropharyngeal erythema present. Tonsils: No tonsillar exudate. Cardiovascular:      Rate and Rhythm: Normal rate and regular rhythm. Pulses: Normal pulses. Pulmonary:      Effort: Pulmonary effort is normal.      Breath sounds: Normal breath sounds. Neurological:      General: No focal deficit present. Mental Status: She is alert and oriented to person, place, and time. Assessment / Plan:    Isidro Whyte was seen today for hypertension. Diagnoses and all orders for this visit:    Diabetic polyneuropathy associated with diabetes mellitus due to underlying condition ([address]  -     rOPINIRole (REQUIP) 4 MG tablet; Take 1 tablet by mouth nightly  -     gabapentin (NEURONTIN) 300 MG capsule; 1 po qam , and 2 po qhs.     Essential hypertension  -     chlorthalidone (HYGROTEN) 50 MG tablet; Take 1 tablet by mouth daily  -     carvedilol (COREG) 25 MG tablet; Take 1 tablet by mouth 2 times daily (with meals)    Anxiety  -     venlafaxine (EFFEXOR XR) 37.5 MG extended release capsule; Take 1 capsule by mouth daily    Type 2 diabetes mellitus with hyperglycemia, with long-term current use of insulin (HCC)  -     glimepiride (AMARYL) 4 MG tablet; Take 1 tablet by mouth every morning (before breakfast)  -     Basic Metabolic Panel; Future    Insomnia, unspecified type  -     traZODone (DESYREL) 100 MG tablet; Take 1 tablet by mouth nightly    Chronic systolic (congestive) heart failure (HCC)    Malignant neoplasm of urinary bladder, unspecified site (HCC)    ETD (Eustachian tube dysfunction), right  -     azithromycin (ZITHROMAX) 250 MG tablet; Take 1 tablet by mouth See Admin Instructions for 5 days 500mg on day 1 followed by 250mg on days 2 - 5    Other orders  -     rosuvastatin (CRESTOR) 10 MG tablet; TAKE 1 TABLET EVERY NIGHT  -     divalproex (DEPAKOTE ER) 500 MG extended release tablet; Take 1 tablet by mouth 2 times daily       Will notify patient of test results. Follow-up and Dispositions    Return if symptoms worsen or fail to improve. Dictated using voice recognition software.  Proofread, but unrecognized errors may exist.

## 2023-02-21 LAB
ANION GAP SERPL CALC-SCNC: 6 MMOL/L (ref 2–11)
BUN SERPL-MCNC: 22 MG/DL (ref 8–23)
CALCIUM SERPL-MCNC: 9.5 MG/DL (ref 8.3–10.4)
CHLORIDE SERPL-SCNC: 105 MMOL/L (ref 101–110)
CO2 SERPL-SCNC: 23 MMOL/L (ref 21–32)
CREAT SERPL-MCNC: 2.1 MG/DL (ref 0.6–1)
GLUCOSE SERPL-MCNC: 188 MG/DL (ref 65–100)
POTASSIUM SERPL-SCNC: 5 MMOL/L (ref 3.5–5.1)
SODIUM SERPL-SCNC: 134 MMOL/L (ref 133–143)

## 2023-03-01 ENCOUNTER — CARE COORDINATION (OUTPATIENT)
Dept: CARE COORDINATION | Facility: CLINIC | Age: 68
End: 2023-03-01

## 2023-03-21 ENCOUNTER — TELEPHONE (OUTPATIENT)
Dept: FAMILY MEDICINE CLINIC | Facility: CLINIC | Age: 68
End: 2023-03-21

## 2023-03-21 NOTE — TELEPHONE ENCOUNTER
Reviewed chart and Aileen Mcclure RN is care coordinator.  Sent staff message to her to contact pt's

## 2023-03-21 NOTE — TELEPHONE ENCOUNTER
Pt's  called asking for name and number of \"\". Pt was in hospital then sent to rehab and he has spoke to the [facility]" but does not remember name or how to contact her. Has questions he would like to ask.

## 2023-03-22 ENCOUNTER — CARE COORDINATION (OUTPATIENT)
Dept: CARE COORDINATION | Facility: CLINIC | Age: 68
End: 2023-03-22

## 2023-03-22 NOTE — CARE COORDINATION
Ambulatory Care Coordination Note  3/22/2023    Patient Current Location:  Home: [address] [place] [address]     ACM contacted the family by telephone. Verified name and  with family as identifiers. Provided introduction to self, and explanation of the ACM role. Challenges to be reviewed by the provider   Additional needs identified to be addressed with provider: No  none               Method of communication with provider: none. ACM: Keegan Callahan RN    ACM received call from pt's  regarding pt stay at [facility] after admission as IP for UTI. He reports he was notified pt was not progressing and may be dc'd soon. Also he is concerned pt has not been given gabapentin needed for pain. He requests ACM speak w/ staff at [facility] as he believes pt needs more time and to be given gabapentin for fibromyalgia pain. ACM called [facility] and spoke w/ pt's nurse, Roseanna White who reports pt is being given oxycodone for pain. She reports pt is not currently being given gabapentin, but is receiving oxycodone for pain. She states pt is more conversant and coherent this am, conversation makes sense. She has been out of bed, had a bath, and worked with PT this am and has been more willing to participate. ACM called  back and shared above information w/ him and that no dc date has been set at this time.  believes pt still needs gabapentin d/t pt pain when she is touched. He will visit pt later today, and keep ACM posted on dc plan.      Offered patient enrollment in the Remote Patient Monitoring (RPM) program for in-home monitoring: NA.       Goals Addressed    None         Future Appointments   Date Time Provider Julia Winkler   2023  2:15 PM Edwina Darling MD FPA [facility]

## 2023-04-05 ENCOUNTER — CARE COORDINATION (OUTPATIENT)
Dept: CARE COORDINATION | Facility: CLINIC | Age: 68
End: 2023-04-05

## 2023-04-18 ENCOUNTER — OFFICE VISIT (OUTPATIENT)
Dept: FAMILY MEDICINE CLINIC | Facility: CLINIC | Age: 68
End: 2023-04-18
Payer: MEDICARE

## 2023-04-18 VITALS
TEMPERATURE: 97.8 F | SYSTOLIC BLOOD PRESSURE: 87 MMHG | BODY MASS INDEX: 35.58 KG/M2 | RESPIRATION RATE: 16 BRPM | DIASTOLIC BLOOD PRESSURE: 49 MMHG | OXYGEN SATURATION: 98 % | WEIGHT: 221.4 LBS | HEART RATE: 50 BPM | HEIGHT: 66 IN

## 2023-04-18 DIAGNOSIS — N39.0 URINARY TRACT INFECTION WITH HEMATURIA, SITE UNSPECIFIED: ICD-10-CM

## 2023-04-18 DIAGNOSIS — E11.65 TYPE 2 DIABETES MELLITUS WITH HYPERGLYCEMIA, WITH LONG-TERM CURRENT USE OF INSULIN (HCC): ICD-10-CM

## 2023-04-18 DIAGNOSIS — I10 ESSENTIAL HYPERTENSION: ICD-10-CM

## 2023-04-18 DIAGNOSIS — Z79.4 TYPE 2 DIABETES MELLITUS WITH HYPERGLYCEMIA, WITH LONG-TERM CURRENT USE OF INSULIN (HCC): ICD-10-CM

## 2023-04-18 DIAGNOSIS — E08.42 DIABETIC POLYNEUROPATHY ASSOCIATED WITH DIABETES MELLITUS DUE TO UNDERLYING CONDITION (HCC): ICD-10-CM

## 2023-04-18 DIAGNOSIS — R31.9 URINARY TRACT INFECTION WITH HEMATURIA, SITE UNSPECIFIED: ICD-10-CM

## 2023-04-18 DIAGNOSIS — N32.89 BLADDER SPASM: ICD-10-CM

## 2023-04-18 DIAGNOSIS — M17.11 PRIMARY OSTEOARTHRITIS OF RIGHT KNEE: Primary | ICD-10-CM

## 2023-04-18 PROCEDURE — 3074F SYST BP LT 130 MM HG: CPT | Performed by: FAMILY MEDICINE

## 2023-04-18 PROCEDURE — 2022F DILAT RTA XM EVC RTNOPTHY: CPT | Performed by: FAMILY MEDICINE

## 2023-04-18 PROCEDURE — 1123F ACP DISCUSS/DSCN MKR DOCD: CPT | Performed by: FAMILY MEDICINE

## 2023-04-18 PROCEDURE — G8427 DOCREV CUR MEDS BY ELIG CLIN: HCPCS | Performed by: FAMILY MEDICINE

## 2023-04-18 PROCEDURE — 1036F TOBACCO NON-USER: CPT | Performed by: FAMILY MEDICINE

## 2023-04-18 PROCEDURE — 1090F PRES/ABSN URINE INCON ASSESS: CPT | Performed by: FAMILY MEDICINE

## 2023-04-18 PROCEDURE — 3046F HEMOGLOBIN A1C LEVEL >9.0%: CPT | Performed by: FAMILY MEDICINE

## 2023-04-18 PROCEDURE — 3017F COLORECTAL CA SCREEN DOC REV: CPT | Performed by: FAMILY MEDICINE

## 2023-04-18 PROCEDURE — G8400 PT W/DXA NO RESULTS DOC: HCPCS | Performed by: FAMILY MEDICINE

## 2023-04-18 PROCEDURE — 3078F DIAST BP <80 MM HG: CPT | Performed by: FAMILY MEDICINE

## 2023-04-18 PROCEDURE — G8417 CALC BMI ABV UP PARAM F/U: HCPCS | Performed by: FAMILY MEDICINE

## 2023-04-18 PROCEDURE — 99213 OFFICE O/P EST LOW 20 MIN: CPT | Performed by: FAMILY MEDICINE

## 2023-04-18 RX ORDER — GABAPENTIN 300 MG/1
300 CAPSULE ORAL 2 TIMES DAILY
Qty: 60 CAPSULE | Refills: 5 | Status: SHIPPED | OUTPATIENT
Start: 2023-04-18 | End: 2023-06-17

## 2023-04-18 RX ORDER — HYOSCYAMINE SULFATE 0.12 MG/1
0.12 TABLET SUBLINGUAL 4 TIMES DAILY PRN
Qty: 120 EACH | Refills: 3 | Status: SHIPPED | OUTPATIENT
Start: 2023-04-18

## 2023-04-18 RX ORDER — CIPROFLOXACIN 500 MG/1
500 TABLET, FILM COATED ORAL 2 TIMES DAILY
Qty: 14 TABLET | Refills: 0 | Status: SHIPPED | OUTPATIENT
Start: 2023-04-18 | End: 2023-04-25

## 2023-04-18 ASSESSMENT — ENCOUNTER SYMPTOMS: BACK PAIN: 1

## 2023-04-18 NOTE — PROGRESS NOTES
Subjective:      Patient ID: Emi Tsai is a 75 y.o. female. HPI  Patient comes in today for follow-up and she had a cystoscope last week by urology which showed a large amount of blood and leukocytes present and they did a culture according to the  but I do not see results on that yet. She has been a little more confused according to the  since she had her cystoscope. She also has been more drowsy at times. She is having some severe pain in her right knee which she has been seen by orthopedics and they would like to get a knee replacement but she is at high risk so they have not done that but attempted other injections. The patient already is on oxycodone from pain management but she has not been on her gabapentin which she previously took for neuropathy. The patient has been more tired and seems to get weaker but her right knee pain is limiting her ability to participate in physical therapy because it hurts with bearing weight. Past Medical History:   Diagnosis Date    Arthritis     osteo    Bladder cancer ([facility] [address] 2010    Cancer ([facility] [address] x 4 years ago     right breast lumpectomy- chemo and radiation    Chronic pain     De Quervain thyroiditis 7/29/2013    Depression 7/29/2013    Diabetes ([facility] [address] no problems with low sugars    type 2 - oral agents.  average BS- 150's         Fibromyalgia     Fibromyalgia 7/29/2013    GERD (gastroesophageal reflux disease)     Headache(784.0)     bad headaches    Hypertension     Insomnia 7/29/2013    Knee pain 7/29/2013    Osteoarthritis 7/29/2013    Panic disorder     has not had problems with for at least a year    Stroke [facility])     Vitamin D deficiency 7/29/2013       Allergies   Allergen Reactions    Amoxicillin Other (See Comments)     whelps    Cephalexin Other (See Comments)     Whelps    Duloxetine Other (See Comments)     seizures    Paroxetine Hcl Other (See Comments)     Did not tolerate well    Sulfa Antibiotics Other (See Comments)     whelps

## 2023-04-21 ENCOUNTER — TELEPHONE (OUTPATIENT)
Dept: FAMILY MEDICINE CLINIC | Facility: CLINIC | Age: 68
End: 2023-04-21

## 2023-04-21 DIAGNOSIS — G47.00 INSOMNIA, UNSPECIFIED TYPE: ICD-10-CM

## 2023-04-21 RX ORDER — TRAZODONE HYDROCHLORIDE 100 MG/1
150 TABLET ORAL NIGHTLY
Qty: 45 TABLET | Refills: 5 | Status: SHIPPED | OUTPATIENT
Start: 2023-04-21

## 2023-04-21 NOTE — TELEPHONE ENCOUNTER
Pt  [employer] called asking if pt can be given a medication to help calm her down. States that the medications that she is on is not helping. Asking for something to help her sleep. Last OV 4/18/23.  Next OV 5/2/23

## 2023-04-21 NOTE — TELEPHONE ENCOUNTER
We will increase the trazodone to 1-1/2  or 150 mg daily and see if that will help her with sleep and hopefully should have some relaxing effect on her.     Sent in prescription for:     Requested Prescriptions     Signed Prescriptions Disp Refills    traZODone (DESYREL) 100 MG tablet 45 tablet 5     Sig: Take 1.5 tablets by mouth nightly     Authorizing Provider: Gomez Reyes

## 2023-04-25 ENCOUNTER — TELEPHONE (OUTPATIENT)
Dept: FAMILY MEDICINE CLINIC | Facility: CLINIC | Age: 68
End: 2023-04-25

## 2023-04-25 NOTE — TELEPHONE ENCOUNTER
A female called stating \" I am a PT and just calling to let Dr. Dalal Breath know pt was admitted to [facility] on Sat. \" No name or number left.

## 2023-04-27 ENCOUNTER — TELEPHONE (OUTPATIENT)
Dept: FAMILY MEDICINE CLINIC | Facility: CLINIC | Age: 68
End: 2023-04-27

## 2023-04-28 ENCOUNTER — TELEPHONE (OUTPATIENT)
Dept: FAMILY MEDICINE CLINIC | Facility: CLINIC | Age: 68
End: 2023-04-28

## 2023-04-28 ENCOUNTER — CARE COORDINATION (OUTPATIENT)
Dept: CARE COORDINATION | Facility: CLINIC | Age: 68
End: 2023-04-28

## 2023-04-28 NOTE — CARE COORDINATION
[facility] Care Transitions Initial Follow Up Call    Call within 2 business days of discharge: Yes    Patient Current Location:  Home:  [address]    ACM  contacted the family by telephone to perform post hospital discharge assessment. Verified name and  with family as identifiers. Provided introduction to self, and explanation of the  ACM  role. Patient: Yung Birch Patient : 1955   MRN: [id number]  Reason for Admission: UTI, low BS  Discharge Date:   RARS: No data recorded    Last Discharge  Street       None            Was this an external facility discharge? Yes, 23  Discharge Facility: [address] [place] [address] to be reviewed by the provider   Additional needs identified to be addressed with provider: Yes  medications-furosemide and Klor Con dc'd upon dc from hospital 23. PCP appt scheduled 23. Method of communication with provider: staff message. Spoke w/ pt's   regarding CANDICE, IP adm for UTI, low BS, per  dehydration. Discussed s/s UTI to report. Pt has [facility], RN visited today. ACM  reviewed discharge instructions, medical action plan, and red flags with family who verbalized understanding. The family was given an opportunity to ask questions and does not have any further questions or concerns at this time. Were discharge instructions available to patient? Yes. Reviewed appropriate site of care based on symptoms and resources available to patient including: PCP  Home health. The family agrees to contact the PCP office for questions related to their healthcare. Scheduled f/u in 1wk. Advance Care Planning:   Does patient have an Advance Directive:   is next of kin . Medication reconciliation was performed with family, who verbalizes understanding of administration of home medications. Medications reviewed, 1111F entered: yes    Was patient discharged with a pulse oximeter?

## 2023-04-28 NOTE — TELEPHONE ENCOUNTER
Layo with [facility] called asking for verbal orders for Dr. Leanna Oh to sign orders. I called and gave Layo these verbal orders already.

## 2023-05-01 ENCOUNTER — TELEPHONE (OUTPATIENT)
Dept: FAMILY MEDICINE CLINIC | Facility: CLINIC | Age: 68
End: 2023-05-01

## 2023-05-01 NOTE — TELEPHONE ENCOUNTER
Thanks for letting us know, will await disposition after being evaluated again in the emergency room

## 2023-05-01 NOTE — TELEPHONE ENCOUNTER
Care Transitions Initial Follow Up Call    Outreach made within 2 business days of discharge: Yes    Patient: Evelin Ricks Patient : 1955   MRN: [id number]  Reason for Admission: Altered Mental Status   Discharge Date: 23       Spoke with: Yao Bennett    Discharge department/facility: [facility] Interactive Patient Contact:  Was patient able to fill all prescriptions: Yes  Was patient instructed to bring all medications to the follow-up visit: Yes  Is patient taking all medications as directed in the discharge summary?  Yes  Does patient understand their discharge instructions: Yes  Does patient have questions or concerns that need addressed prior to 7-14 day follow up office visit: no    Scheduled appointment with PCP within 7-14 days    Follow Up  Future Appointments   Date Time Provider Julia Winkler   2023  2:15 PM MD XENIA [place]ford   2023 12:00 PM MD XENIA Ashford

## 2023-05-01 NOTE — TELEPHONE ENCOUNTER
Mallory PT with [facility] called. Stating she arrived at Pt's home to see Pt. Pt with fever and low O2. Pt is being transported via EMS to hosp. Stating she is going to discuss Pt with the Hospice Social Worker. Cryotherapy Text: The wound bed was treated with cryotherapy after the biopsy was performed.

## 2023-05-11 ENCOUNTER — CARE COORDINATION (OUTPATIENT)
Dept: CARE COORDINATION | Facility: CLINIC | Age: 68
End: 2023-05-11

## 2023-05-11 NOTE — CARE COORDINATION
Ambulatory Care Coordination Note  2023    Patient Current Location:  Home: [address]     ACM contacted the family by telephone. Verified name and  with family as identifiers. Provided introduction to self, and explanation of the ACM role. Challenges to be reviewed by the provider   Additional needs identified to be addressed with provider: Yes   reports pt has dark urine with odor. Method of communication with provider: staff message. ACM: Luis Manuel French RN    Spoke w/ pt's , discussed recent IP stay for UTI. Pt now w/ [facility], hospital bed.  concerned urine dark with smell. Pt now on 2ltrs O2. ACM sent staff message to PCP regarding c/o Uti sxs ongoing. Will follow for outcome. Offered patient enrollment in the Remote Patient Monitoring (RPM) program for in-home monitoring: NA.       Goals Addressed                   This Visit's Progress     Conditions and Symptoms   On track     I will schedule office visits, as directed by my provider. I will keep my appointment or reschedule if I have to cancel. I will notify my provider of any barriers to my plan of care. I will follow my Zone Management tool to seek urgent or emergent care. I will notify my provider of any symptoms that indicate a worsening of my condition.     Barriers: impairment:  cognitive  Plan for overcoming my barriers: Uli Hough assistance, education  Confidence: 10/10  Anticipated Goal Completion Date: 23                Future Appointments   Date Time Provider Julia Winkler   2023 12:00 PM MD XENIA Green AMB

## 2023-05-12 ENCOUNTER — CARE COORDINATION (OUTPATIENT)
Dept: CARE COORDINATION | Facility: CLINIC | Age: 68
End: 2023-05-12

## 2023-05-12 ENCOUNTER — TELEPHONE (OUTPATIENT)
Dept: FAMILY MEDICINE CLINIC | Facility: CLINIC | Age: 68
End: 2023-05-12

## 2023-05-12 RX ORDER — CIPROFLOXACIN 500 MG/1
500 TABLET, FILM COATED ORAL 2 TIMES DAILY
Qty: 14 TABLET | Refills: 0 | Status: SHIPPED | OUTPATIENT
Start: 2023-05-12 | End: 2023-05-19

## 2023-05-12 NOTE — TELEPHONE ENCOUNTER
----- Message from Donaldo Zhang RN sent at 5/11/2023 12:54 PM EDT -----  Regarding: concern for UTI  Dr. Simon Olsen,    Mr. Gamaliel Waterman reports pt w/ dark urine and foul odor, recently dc from [facility]. Per MD note \"Acute cystitis without hematuria: Urinalysis positive for moderate budding yeast, occasional bacteria and urine WBC >182, large leukocyte esterase, negative nitrite and moderate blood. Urine culture ordered but not sent. Patient received 3 days of IV ceftriaxone and stopped. Continued fluconazole for candiduria. \"    Please advise.       Thank you,    Nisha Calero" Apolonia Aleman@"[employer] Manager  c: [phone] / Garth Barger

## 2023-05-12 NOTE — TELEPHONE ENCOUNTER
Sent in prescription for:     Requested Prescriptions     Signed Prescriptions Disp Refills    ciprofloxacin (CIPRO) 500 MG tablet 14 tablet 0     Sig: Take 1 tablet by mouth 2 times daily for 7 days     Authorizing Provider: Leopoldo Levers will try cipro, can we do a urine culture one week after finishing antibiotics to ensure clearance.   I assume she finished the fluconazole which should have taken care of the budding yeast.

## 2023-05-12 NOTE — CARE COORDINATION
ACM notified  of PCP response, Cipro ordered and urine cx to be drawn one wk after antibiotic completion.  verbalized understanding. ACM scheduled f/u in 2wks.

## 2023-05-21 RX ORDER — DOXYCYCLINE 100 MG/1
TABLET ORAL
Qty: 20 TABLET | Refills: 0 | Status: SHIPPED | OUTPATIENT
Start: 2023-05-21

## 2023-05-26 ENCOUNTER — CARE COORDINATION (OUTPATIENT)
Dept: CARE COORDINATION | Facility: CLINIC | Age: 68
End: 2023-05-26

## 2023-05-26 NOTE — CARE COORDINATION
Ambulatory Care Coordination Note  2023    Patient Current Location:  Home: [address]     ACM contacted the family by telephone. Verified name and  with family as identifiers. Provided introduction to self, and explanation of the ACM role. Challenges to be reviewed by the provider   Additional needs identified to be addressed with provider: No  none               Method of communication with provider: none. ACM: Kate Marmolejo RN    Heart Failure Education outreach Date/Time: 2023 2:00 PM    Ambulatory Care Manager (ACM) contacted the family by telephone to perform Ambulatory Care Coordination. Verified name and  with family as identifiers. Provided introduction to self, and explanation of the Ambulatory Care Manager's role. ACM reviewed that a Health Healthy tips for the Summer packet has been mailed to the them. ACM reviewed CHF zones, daily weights, fluid restriction, the importance of low sodium diet, and healthy tips packet with the family. Instructed family to call their PCP if they have a weight gain of 3 lbs in 2 days or 5 lbs in a week. Patient reminded that there is a physician on call 24 hours a day / 7 days a week should the patient have questions or concerns. The family verbalized understanding.  reports no change in pt condition, continues to receive palliative care serves and being treated currently for Uti. He will call when antibiotic complete for f/u urine. Scheduled f/u in 2wks.      Offered patient enrollment in the Remote Patient Monitoring (RPM) program for in-home monitoring: NA.    Care Coordination Interventions    Referral from Primary Care Provider: No  Suggested Interventions and Community Resources          Goals Addressed    None         Future Appointments   Date Time Provider Julia Winkler   2023 12:00 PM MD XENIA Coulter AMB

## 2023-07-13 ENCOUNTER — CARE COORDINATION (OUTPATIENT)
Dept: CARE COORDINATION | Facility: CLINIC | Age: 68
End: 2023-07-13

## 2023-07-13 NOTE — CARE COORDINATION
RNCM 2nd unsuccessful attempt to reach pt, no answer. Will make one additional attempt to reach pt/.

## 2023-07-25 ENCOUNTER — CARE COORDINATION (OUTPATIENT)
Dept: CARE COORDINATION | Facility: CLINIC | Age: 68
End: 2023-07-25

## (undated) DEVICE — CATHETER DIAG AD 5FR L110CM 145DEG COR GRY HYDRPHLC NYL

## (undated) DEVICE — GLIDESHEATH SLENDER STAINLESS STEEL KIT: Brand: GLIDESHEATH SLENDER

## (undated) DEVICE — GUIDEWIRE 035IN 210CM PTFE COAT FIX COR J TIP 15MM FIRM BODY

## (undated) DEVICE — RADIFOCUS OPTITORQUE ANGIOGRAPHIC CATHETER: Brand: OPTITORQUE

## (undated) DEVICE — BAND RADIAL COMPR ARTERY 24CM -- REG BX/10